# Patient Record
Sex: FEMALE | Race: WHITE | NOT HISPANIC OR LATINO | Employment: FULL TIME
[De-identification: names, ages, dates, MRNs, and addresses within clinical notes are randomized per-mention and may not be internally consistent; named-entity substitution may affect disease eponyms.]

---

## 2019-01-15 ENCOUNTER — TRANSCRIBE ORDERS (OUTPATIENT)
Dept: SCHEDULING | Age: 74
End: 2019-01-15

## 2019-01-15 DIAGNOSIS — Z12.31 ENCOUNTER FOR SCREENING MAMMOGRAM FOR MALIGNANT NEOPLASM OF BREAST: Primary | ICD-10-CM

## 2019-01-28 ENCOUNTER — TRANSCRIBE ORDERS (OUTPATIENT)
Dept: SCHEDULING | Age: 74
End: 2019-01-28

## 2019-01-28 DIAGNOSIS — M89.9 DISORDER OF BONE: Primary | ICD-10-CM

## 2019-02-05 ENCOUNTER — HOSPITAL ENCOUNTER (OUTPATIENT)
Dept: RADIOLOGY | Facility: CLINIC | Age: 74
Discharge: HOME | End: 2019-02-05
Attending: INTERNAL MEDICINE
Payer: COMMERCIAL

## 2019-02-05 DIAGNOSIS — Z12.31 ENCOUNTER FOR SCREENING MAMMOGRAM FOR MALIGNANT NEOPLASM OF BREAST: ICD-10-CM

## 2019-02-05 DIAGNOSIS — M89.9 DISORDER OF BONE: ICD-10-CM

## 2019-02-05 PROCEDURE — 77080 DXA BONE DENSITY AXIAL: CPT

## 2019-02-05 PROCEDURE — 77067 SCR MAMMO BI INCL CAD: CPT

## 2019-02-07 ENCOUNTER — TRANSCRIBE ORDERS (OUTPATIENT)
Dept: RADIOLOGY | Age: 74
End: 2019-02-07

## 2019-02-07 DIAGNOSIS — R92.8 OTHER ABNORMAL AND INCONCLUSIVE FINDINGS ON DIAGNOSTIC IMAGING OF BREAST: Primary | ICD-10-CM

## 2019-02-11 ENCOUNTER — HOSPITAL ENCOUNTER (OUTPATIENT)
Dept: RADIOLOGY | Age: 74
Discharge: HOME | End: 2019-02-11
Attending: RADIOLOGY
Payer: COMMERCIAL

## 2019-02-11 DIAGNOSIS — R92.8 OTHER ABNORMAL AND INCONCLUSIVE FINDINGS ON DIAGNOSTIC IMAGING OF BREAST: ICD-10-CM

## 2019-02-11 PROCEDURE — 77065 DX MAMMO INCL CAD UNI: CPT | Mod: RT

## 2020-02-03 ENCOUNTER — TRANSCRIBE ORDERS (OUTPATIENT)
Dept: SCHEDULING | Age: 75
End: 2020-02-03

## 2020-02-03 DIAGNOSIS — I71.20 THORACIC AORTIC ANEURYSM, WITHOUT RUPTURE: ICD-10-CM

## 2020-02-03 DIAGNOSIS — Z12.31 ENCOUNTER FOR SCREENING MAMMOGRAM FOR MALIGNANT NEOPLASM OF BREAST: Primary | ICD-10-CM

## 2020-02-13 ENCOUNTER — HOSPITAL ENCOUNTER (OUTPATIENT)
Dept: RADIOLOGY | Facility: CLINIC | Age: 75
Discharge: HOME | End: 2020-02-13
Attending: INTERNAL MEDICINE
Payer: COMMERCIAL

## 2020-02-13 DIAGNOSIS — Z12.31 ENCOUNTER FOR SCREENING MAMMOGRAM FOR MALIGNANT NEOPLASM OF BREAST: ICD-10-CM

## 2020-02-13 PROCEDURE — 77067 SCR MAMMO BI INCL CAD: CPT

## 2020-02-18 ENCOUNTER — HOSPITAL ENCOUNTER (OUTPATIENT)
Dept: CARDIOLOGY | Facility: CLINIC | Age: 75
Discharge: HOME | End: 2020-02-18
Attending: INTERNAL MEDICINE
Payer: COMMERCIAL

## 2020-02-18 DIAGNOSIS — I71.20 THORACIC AORTIC ANEURYSM, WITHOUT RUPTURE: ICD-10-CM

## 2020-02-18 LAB
ABDOMINAL DIST AORTA AP: 4.12 CM
ABDOMINAL DIST AORTA TRANS: 5.27 CM
ABDOMINAL DIST AORTA VEL: 95.7 CM/S
ABDOMINAL LT COM ILIAC AP: 1.78 CM
ABDOMINAL LT COM ILIAC TRANS: 1.99 CM
ABDOMINAL LT COM ILIAC VEL: 99.2 CM/S
ABDOMINAL MID AORTA AP: 5.19 CM
ABDOMINAL MID AORTA TRANS: 5.72 CM
ABDOMINAL MID AORTA VEL: 64.1 CM/S
ABDOMINAL PROX AORTA AP: 4.56 CM
ABDOMINAL PROX AORTA TRANS: 5.38 CM
ABDOMINAL PROX AORTA VEL: 81.3 CM/S
ABDOMINAL RT COM ILIAC AP: 1.59 CM
ABDOMINAL RT COM ILIAC TRANS: 1.61 CM
ABDOMINAL RT COM ILIAC VEL: 101.4 CM/S
DIST AORTA EDV: 17 CM/S
LEFT COMMON ILIAC RATIO: 1.04
LT COM ILIAC PROX EDV: 11.4 CM/S
MID AORTA EDV: 10.1 CM/S
PROX AORTA EDV: 9.8 CM/S
RIGHT COMMON ILIAC RATIO: 1.06
RT COM ILIAC PROX EDV: 15.8 CM/S

## 2020-02-18 PROCEDURE — 93978 VASCULAR STUDY: CPT

## 2020-03-09 ENCOUNTER — TRANSCRIBE ORDERS (OUTPATIENT)
Dept: SCHEDULING | Age: 75
End: 2020-03-09

## 2020-03-09 DIAGNOSIS — J47.9 BRONCHIECTASIS, UNCOMPLICATED (CMS/HCC): Primary | ICD-10-CM

## 2020-03-10 ENCOUNTER — HOSPITAL ENCOUNTER (OUTPATIENT)
Dept: RADIOLOGY | Facility: CLINIC | Age: 75
Discharge: HOME | End: 2020-03-10
Attending: INTERNAL MEDICINE
Payer: COMMERCIAL

## 2020-03-10 VITALS — WEIGHT: 165 LBS

## 2020-03-10 DIAGNOSIS — J47.9 BRONCHIECTASIS, UNCOMPLICATED (CMS/HCC): ICD-10-CM

## 2020-03-10 PROCEDURE — 71250 CT THORAX DX C-: CPT

## 2020-04-09 ENCOUNTER — TRANSCRIBE ORDERS (OUTPATIENT)
Dept: SCHEDULING | Age: 75
End: 2020-04-09

## 2020-04-09 ENCOUNTER — TRANSCRIBE ORDERS (OUTPATIENT)
Dept: CARDIOLOGY | Facility: HOSPITAL | Age: 75
End: 2020-04-09

## 2020-04-09 ENCOUNTER — APPOINTMENT (OUTPATIENT)
Dept: LAB | Facility: HOSPITAL | Age: 75
End: 2020-04-09
Attending: SURGERY
Payer: COMMERCIAL

## 2020-04-09 ENCOUNTER — TELEPHONE (OUTPATIENT)
Dept: CARDIOLOGY | Facility: HOSPITAL | Age: 75
End: 2020-04-09

## 2020-04-09 DIAGNOSIS — Z01.810 PREOP CARDIOVASCULAR EXAM: Primary | ICD-10-CM

## 2020-04-09 DIAGNOSIS — I71.40 ABDOMINAL AORTIC ANEURYSM, WITHOUT RUPTURE: Primary | ICD-10-CM

## 2020-04-09 DIAGNOSIS — I11.9 HYPERTENSIVE HEART DISEASE WITHOUT HEART FAILURE: ICD-10-CM

## 2020-04-09 DIAGNOSIS — I71.40 ABDOMINAL AORTIC ANEURYSM, WITHOUT RUPTURE: ICD-10-CM

## 2020-04-09 LAB
BUN SERPL-MCNC: 19 MG/DL (ref 8–20)
CREAT SERPL-MCNC: 0.7 MG/DL (ref 0.6–1.1)
GFR SERPL CREATININE-BSD FRML MDRD: >60 ML/MIN/1.73M*2

## 2020-04-09 PROCEDURE — 36415 COLL VENOUS BLD VENIPUNCTURE: CPT

## 2020-04-09 PROCEDURE — 84520 ASSAY OF UREA NITROGEN: CPT

## 2020-04-09 PROCEDURE — 82565 ASSAY OF CREATININE: CPT

## 2020-04-10 ENCOUNTER — HOSPITAL ENCOUNTER (OUTPATIENT)
Dept: CARDIOLOGY | Facility: HOSPITAL | Age: 75
Discharge: HOME | End: 2020-04-10
Attending: INTERNAL MEDICINE
Payer: COMMERCIAL

## 2020-04-10 ENCOUNTER — HOSPITAL ENCOUNTER (OUTPATIENT)
Dept: RADIOLOGY | Facility: HOSPITAL | Age: 75
Discharge: HOME | End: 2020-04-10
Attending: SURGERY
Payer: COMMERCIAL

## 2020-04-10 DIAGNOSIS — Z01.810 PREOP CARDIOVASCULAR EXAM: ICD-10-CM

## 2020-04-10 DIAGNOSIS — I71.40 ABDOMINAL AORTIC ANEURYSM, WITHOUT RUPTURE: ICD-10-CM

## 2020-04-10 DIAGNOSIS — I11.9 HYPERTENSIVE HEART DISEASE WITHOUT HEART FAILURE: ICD-10-CM

## 2020-04-10 PROCEDURE — 74174 CTA ABD&PLVS W/CONTRAST: CPT

## 2020-04-10 PROCEDURE — 63600105 HC IODINE BASED CONTRAST: Performed by: SURGERY

## 2020-04-10 PROCEDURE — 93005 ELECTROCARDIOGRAM TRACING: CPT

## 2020-04-10 RX ADMIN — IOHEXOL 115 ML: 300 INJECTION, SOLUTION INTRAVENOUS at 10:45

## 2020-04-11 LAB
ATRIAL RATE: 66
P AXIS: 49
PR INTERVAL: 176
QRS DURATION: 92
QT INTERVAL: 402
QTC CALCULATION(BAZETT): 421
R AXIS: 10
T WAVE AXIS: 88
VENTRICULAR RATE: 66

## 2021-03-01 ENCOUNTER — TRANSCRIBE ORDERS (OUTPATIENT)
Dept: SCHEDULING | Age: 76
End: 2021-03-01

## 2021-03-01 DIAGNOSIS — N95.8 OTHER SPECIFIED MENOPAUSAL AND PERIMENOPAUSAL DISORDERS: ICD-10-CM

## 2021-03-01 DIAGNOSIS — I77.1 STRICTURE OF ARTERY (CMS/HCC): Primary | ICD-10-CM

## 2021-03-01 DIAGNOSIS — I72.8 ANEURYSM OF OTHER SPECIFIED ARTERIES: Primary | ICD-10-CM

## 2021-03-01 DIAGNOSIS — I72.8 ANEURYSM OF OTHER SPECIFIED ARTERIES: ICD-10-CM

## 2021-03-01 DIAGNOSIS — Z12.31 ENCOUNTER FOR SCREENING MAMMOGRAM FOR MALIGNANT NEOPLASM OF BREAST: Primary | ICD-10-CM

## 2021-03-11 ENCOUNTER — TRANSCRIBE ORDERS (OUTPATIENT)
Dept: SCHEDULING | Age: 76
End: 2021-03-11

## 2021-03-11 DIAGNOSIS — I72.8 ANEURYSM OF OTHER SPECIFIED ARTERIES: Primary | ICD-10-CM

## 2021-03-18 ENCOUNTER — TRANSCRIBE ORDERS (OUTPATIENT)
Dept: REGISTRATION | Facility: CLINIC | Age: 76
End: 2021-03-18

## 2021-03-18 ENCOUNTER — APPOINTMENT (OUTPATIENT)
Dept: LAB | Facility: CLINIC | Age: 76
End: 2021-03-18
Attending: INTERNAL MEDICINE
Payer: COMMERCIAL

## 2021-03-18 DIAGNOSIS — M06.9 RHEUMATOID ARTHRITIS, UNSPECIFIED: ICD-10-CM

## 2021-03-18 DIAGNOSIS — M06.9 RHEUMATOID ARTHRITIS, UNSPECIFIED: Primary | ICD-10-CM

## 2021-03-18 LAB
ANION GAP SERPL CALC-SCNC: 10 MEQ/L (ref 3–15)
BUN SERPL-MCNC: 14 MG/DL (ref 8–20)
CALCIUM SERPL-MCNC: 9.5 MG/DL (ref 8.9–10.3)
CHLORIDE SERPL-SCNC: 102 MEQ/L (ref 98–109)
CO2 SERPL-SCNC: 29 MEQ/L (ref 22–32)
CREAT SERPL-MCNC: 0.7 MG/DL (ref 0.6–1.1)
GFR SERPL CREATININE-BSD FRML MDRD: >60 ML/MIN/1.73M*2
GLUCOSE SERPL-MCNC: 138 MG/DL (ref 70–99)
POTASSIUM SERPL-SCNC: 4.3 MEQ/L (ref 3.6–5.1)
SODIUM SERPL-SCNC: 141 MEQ/L (ref 136–144)

## 2021-03-18 PROCEDURE — 80048 BASIC METABOLIC PNL TOTAL CA: CPT

## 2021-03-18 PROCEDURE — 36415 COLL VENOUS BLD VENIPUNCTURE: CPT

## 2021-03-19 ENCOUNTER — HOSPITAL ENCOUNTER (OUTPATIENT)
Dept: RADIOLOGY | Facility: CLINIC | Age: 76
Discharge: HOME | End: 2021-03-19
Attending: INTERNAL MEDICINE
Payer: COMMERCIAL

## 2021-03-19 DIAGNOSIS — I72.8 ANEURYSM OF OTHER SPECIFIED ARTERIES: ICD-10-CM

## 2021-03-19 PROCEDURE — 63600105 HC IODINE BASED CONTRAST

## 2021-03-19 PROCEDURE — 74174 CTA ABD&PLVS W/CONTRAST: CPT

## 2021-03-19 RX ADMIN — IOHEXOL 125 ML: 350 INJECTION, SOLUTION INTRAVENOUS at 11:47

## 2021-03-30 ENCOUNTER — TRANSCRIBE ORDERS (OUTPATIENT)
Dept: SCHEDULING | Age: 76
End: 2021-03-30

## 2021-03-30 DIAGNOSIS — G43.709 CHRONIC MIGRAINE WITHOUT AURA, NOT INTRACTABLE, WITHOUT STATUS MIGRAINOSUS: ICD-10-CM

## 2021-03-30 DIAGNOSIS — I65.23 OCCLUSION AND STENOSIS OF BILATERAL CAROTID ARTERIES: ICD-10-CM

## 2021-03-30 DIAGNOSIS — E78.5 HYPERLIPIDEMIA, UNSPECIFIED: ICD-10-CM

## 2021-03-30 DIAGNOSIS — I77.1 STRICTURE OF ARTERY (CMS/HCC): Primary | ICD-10-CM

## 2021-04-01 ENCOUNTER — HOSPITAL ENCOUNTER (OUTPATIENT)
Dept: CARDIOLOGY | Facility: CLINIC | Age: 76
Discharge: HOME | End: 2021-04-01
Attending: INTERNAL MEDICINE
Payer: COMMERCIAL

## 2021-04-01 VITALS
BODY MASS INDEX: 28.17 KG/M2 | WEIGHT: 165 LBS | SYSTOLIC BLOOD PRESSURE: 125 MMHG | DIASTOLIC BLOOD PRESSURE: 80 MMHG | HEIGHT: 64 IN

## 2021-04-01 DIAGNOSIS — E78.5 HYPERLIPIDEMIA, UNSPECIFIED: ICD-10-CM

## 2021-04-01 DIAGNOSIS — I77.1 STRICTURE OF ARTERY (CMS/HCC): ICD-10-CM

## 2021-04-01 DIAGNOSIS — G43.709 CHRONIC MIGRAINE WITHOUT AURA, NOT INTRACTABLE, WITHOUT STATUS MIGRAINOSUS: ICD-10-CM

## 2021-04-01 DIAGNOSIS — I65.23 OCCLUSION AND STENOSIS OF BILATERAL CAROTID ARTERIES: ICD-10-CM

## 2021-04-01 LAB
BSA FOR ECHO PROCEDURE: 1.84 M2
LEFT CCA DIST DIAS: 29.63 CM/S
LEFT CCA DIST SYS: 87.84 CM/S
LEFT CCA MID DIAS: 23.16 CM/S
LEFT CCA MID SYS: 76.2 CM/S
LEFT CCA PROX DIAS: 25.74 CM/S
LEFT CCA PROX SYS: 86.55 CM/S
LEFT ECA DIAS: 20.66 CM/S
LEFT ECA SYS: 90.31 CM/S
LEFT ICA DIST DIAS: 43.19 CM/S
LEFT ICA DIST SYS: 101.47 CM/S
LEFT ICA MID DIAS: 26.15 CM/S
LEFT ICA MID SYS: 58.71 CM/S
LEFT ICA PROX DIAS: 18.81 CM/S
LEFT ICA PROX SYS: 45.07 CM/S
LEFT ICA/CCA SYS: 1.16
LEFT VERTEBRAL DIAS: 17.65 CM/S
LEFT VERTEBRAL SYS: 59.07 CM/S
LT ECA PROX EDV: 20.7 CM/S
LT ECA PROX PSV: 90.3 CM/S
LT VERTEBRAL MID EDV: 17.7 CM/S
LT VERTEBRAL MID PSV: 59.1 CM/S
RIGHT CCA DIST DIAS: 23.35 CM/S
RIGHT CCA DIST SYS: 66.39 CM/S
RIGHT CCA MID DIAS: 22.04 CM/S
RIGHT CCA MID SYS: 72.91 CM/S
RIGHT CCA PROX DIAS: 23.35 CM/S
RIGHT CCA PROX SYS: 74.22 CM/S
RIGHT ECA DIAS: 25.43 CM/S
RIGHT ECA SYS: 86.53 CM/S
RIGHT ICA DIST DIAS: 24.54 CM/S
RIGHT ICA DIST SYS: 59.04 CM/S
RIGHT ICA MID DIAS: 24.34 CM/S
RIGHT ICA MID SYS: 60.1 CM/S
RIGHT ICA PROX DIAS: 32.19 CM/S
RIGHT ICA PROX SYS: 50.51 CM/S
RIGHT ICA/CCA SYS: 0.91
RIGHT VERTEBRAL DIAS: 8.61 CM/S
RT ECA PROC EDV: 25.4 CM/S
RT VERTEBRAL MID EDV: 8.6 CM/S
RT VERTEBRAL MID PSV: 31.5 CM/S

## 2021-04-01 PROCEDURE — 93880 EXTRACRANIAL BILAT STUDY: CPT

## 2021-04-01 PROCEDURE — 93306 TTE W/DOPPLER COMPLETE: CPT

## 2021-04-02 LAB
AORTIC ROOT ANNULUS - M-MODE: 3.6 CM
AV REG PEAK VEL: 4.07 M/S
AV REGURGITATION PRESSURE HALF TIME: 1089 MS
BSA FOR ECHO PROCEDURE: 1.84 M2
E WAVE DECELERATION TIME: 285 MS
E/A RATIO: 0.9
E/E' RATIO: 21.6
E/LAT E' RATIO: 13.5
EDV (BP): 55.1 CM3
EDV (MM-TEICH): 195 CM3
EF (A4C): 49.3 %
EF (MM-TEICH): 75.8 %
EF A2C: 55.3 %
EJECTION FRACTION: 51.7 %
ESV (BP): 26.6 CM3
ESV (MM-TEICH): 47.1 CM3
LA/AORTA RATIO: 1.11
LAD 2D - M-MODE: 4 CM
LEFT VENTRICLE DIASTOLIC VOLUME INDEX: 28.97 CM3/M2
LEFT VENTRICLE DIASTOLIC VOLUME: 53.3 CM3
LEFT VENTRICLE SYSTOLIC VOLUME INDEX: 14.67 CM3/M2
LEFT VENTRICLE SYSTOLIC VOLUME: 27 CM3
LV DIASTOLIC VOLUME: 54.9 CM3
LV ESV (APICAL 2 CHAMBER): 24.5 CM3
LVAD-AP2: 21.8 CM2
LVAD-AP4: 21.8 CM2
LVAS-AP2: 12.7 CM2
LVAS-AP4: 14 CM2
LVEDVI(A2C): 29.84 CM3/M2
LVEDVI(BP): 29.95 CM3/M2
LVESVI(A2C): 13.32 CM3/M2
LVESVI(BP): 14.46 CM3/M2
LVLD-AP2: 7.05 CM
LVLD-AP4: 7.35 CM
LVLS-AP2: 5.9 CM
LVLS-AP4: 6.34 CM
M MODE - INTERVENTRICULAR SEPTUM IN END DIASTOLE: 1.13 CM
M MODE - LEFT INTERNAL DIMENSION IN SYSTOLE: 3.39 CM
M MODE - LEFT VENTRICULAR INTERNAL DIMENSION IN DIASTOLE: 6.21 CM
M MODE - LEFT VENTRICULAR POSTERIOR WALL IN END DIASTOLE: 1.17 CM
M MODE - LEFT VENTRICULAR POSTERIOR WALL IN END DIASTOLE: 1.17 CM
MV E'TISSUE VEL-LAT: 0.06 M/S
MV E'TISSUE VEL-MED: 0.04 M/S
MV PEAK A VEL: 0.91 M/S
MV PEAK E VEL: 0.82 M/S

## 2021-04-08 ENCOUNTER — HOSPITAL ENCOUNTER (OUTPATIENT)
Dept: RADIOLOGY | Facility: CLINIC | Age: 76
Discharge: HOME | End: 2021-04-08
Attending: INTERNAL MEDICINE
Payer: COMMERCIAL

## 2021-04-08 DIAGNOSIS — N95.8 OTHER SPECIFIED MENOPAUSAL AND PERIMENOPAUSAL DISORDERS: ICD-10-CM

## 2021-04-08 DIAGNOSIS — Z12.31 ENCOUNTER FOR SCREENING MAMMOGRAM FOR MALIGNANT NEOPLASM OF BREAST: ICD-10-CM

## 2021-04-08 PROCEDURE — 77080 DXA BONE DENSITY AXIAL: CPT

## 2021-04-08 PROCEDURE — 77063 BREAST TOMOSYNTHESIS BI: CPT

## 2021-06-08 ENCOUNTER — TRANSCRIBE ORDERS (OUTPATIENT)
Dept: SCHEDULING | Age: 76
End: 2021-06-08

## 2021-06-08 DIAGNOSIS — I77.1 STRICTURE OF ARTERY (CMS/HCC): Primary | ICD-10-CM

## 2021-06-28 ENCOUNTER — HOSPITAL ENCOUNTER (OUTPATIENT)
Dept: CARDIOLOGY | Facility: CLINIC | Age: 76
Discharge: HOME | End: 2021-06-28
Attending: INTERNAL MEDICINE
Payer: COMMERCIAL

## 2021-06-28 ENCOUNTER — TRANSCRIBE ORDERS (OUTPATIENT)
Dept: LAB | Facility: CLINIC | Age: 76
End: 2021-06-28

## 2021-06-28 ENCOUNTER — APPOINTMENT (OUTPATIENT)
Dept: LAB | Facility: CLINIC | Age: 76
End: 2021-06-28
Attending: INTERNAL MEDICINE
Payer: COMMERCIAL

## 2021-06-28 DIAGNOSIS — I77.1 STRICTURE OF ARTERY (CMS/HCC): ICD-10-CM

## 2021-06-28 DIAGNOSIS — E78.5 HYPERLIPIDEMIA, UNSPECIFIED: Primary | ICD-10-CM

## 2021-06-28 DIAGNOSIS — E78.5 HYPERLIPIDEMIA, UNSPECIFIED: ICD-10-CM

## 2021-06-28 LAB
ALBUMIN SERPL-MCNC: 3.8 G/DL (ref 3.4–5)
ALP SERPL-CCNC: 49 IU/L (ref 35–126)
ALT SERPL-CCNC: 24 IU/L (ref 11–54)
ANION GAP SERPL CALC-SCNC: 10 MEQ/L (ref 3–15)
AST SERPL-CCNC: 22 IU/L (ref 15–41)
BILIRUB SERPL-MCNC: 0.6 MG/DL (ref 0.3–1.2)
BUN SERPL-MCNC: 17 MG/DL (ref 8–20)
CALCIUM SERPL-MCNC: 9.8 MG/DL (ref 8.9–10.3)
CHLORIDE SERPL-SCNC: 104 MEQ/L (ref 98–109)
CHOLEST SERPL-MCNC: 160 MG/DL
CO2 SERPL-SCNC: 28 MEQ/L (ref 22–32)
CREAT SERPL-MCNC: 0.7 MG/DL (ref 0.6–1.1)
GFR SERPL CREATININE-BSD FRML MDRD: >60 ML/MIN/1.73M*2
GLUCOSE SERPL-MCNC: 102 MG/DL (ref 70–99)
HDLC SERPL-MCNC: 58 MG/DL
HDLC SERPL: 2.8 {RATIO}
LDLC SERPL CALC-MCNC: 88 MG/DL
LT AXILLARY PSV: 42.3 CM/S
LT AXILLARY RATIO: 0.69
LT BRACHIAL DIST PSV: 72.9 CM/S
LT BRACHIAL DIST RATIO: 1.25
LT BRACHIAL MID PSV: 58.4 CM/S
LT BRACHIAL MID RATIO: 0.68
LT BRACHIAL PROX PSV: 85.9 CM/S
LT BRACHIAL PROX RATIO: 2.03
LT RADIAL DIST PSV: 39 CM/S
LT RADIAL PROX PSV: 45.5 CM/S
LT RADIAL PROX RATIO: 0.62
LT SUBCLAVIAN DIST PSV: 60.9 CM/S
LT SUBCLAVIAN DIST RATIO: 0.87
LT SUBCLAVIAN MID PSV: 70.16 CM/S
LT SUBCLAVIAN MID RATIO: 0.67
LT SUBCLAVIAN PROX PSV: 105.03 CM/S
LT ULNAR DIST PSV: 51.8 CM/S
LT ULNAR PROX PSV: 43.8 CM/S
LT ULNAR PROX RATIO: 0.6
NONHDLC SERPL-MCNC: 102 MG/DL
POTASSIUM SERPL-SCNC: 4.7 MEQ/L (ref 3.6–5.1)
PROT SERPL-MCNC: 6.5 G/DL (ref 6–8.2)
RT AXILLARY PSV: 41.2 CM/S
RT AXILLARY RATIO: 0.87
RT BRACHIAL DIST PSV: 61.6 CM/S
RT BRACHIAL DIST RATIO: 0.97
RT BRACHIAL MID PSV: 63.3 CM/S
RT BRACHIAL MID RATIO: 0.83
RT BRACHIAL PROX PSV: 76.2 CM/S
RT BRACHIAL PROX RATIO: 1.85
RT RADIAL DIST PSV: 28.7 CM/S
RT RADIAL PROX PSV: 35 CM/S
RT RADIAL PROX RATIO: 0.57
RT SUBCLAVIAN DIST PSV: 47.6 CM/S
RT SUBCLAVIAN DIST RATIO: 0.85
RT SUBCLAVIAN MID PSV: 55.7 CM/S
RT SUBCLAVIAN MID RATIO: 0.9
RT SUBCLAVIAN PROX PSV: 62.2 CM/S
RT ULNAR DIST PSV: 30 CM/S
RT ULNAR PROX PSV: 33.9 CM/S
RT ULNAR PROX RATIO: 0.55
SODIUM SERPL-SCNC: 142 MEQ/L (ref 136–144)
TRIGL SERPL-MCNC: 71 MG/DL (ref 30–149)
TSH SERPL DL<=0.05 MIU/L-ACNC: 1.75 MIU/L (ref 0.34–5.6)

## 2021-06-28 PROCEDURE — 84443 ASSAY THYROID STIM HORMONE: CPT

## 2021-06-28 PROCEDURE — 80061 LIPID PANEL: CPT

## 2021-06-28 PROCEDURE — 36415 COLL VENOUS BLD VENIPUNCTURE: CPT

## 2021-06-28 PROCEDURE — 93930 UPPER EXTREMITY STUDY: CPT

## 2021-06-28 PROCEDURE — 80053 COMPREHEN METABOLIC PANEL: CPT

## 2022-03-02 ENCOUNTER — HOSPITAL ENCOUNTER (OUTPATIENT)
Dept: RADIOLOGY | Facility: CLINIC | Age: 77
Discharge: HOME | End: 2022-03-02
Attending: INTERNAL MEDICINE
Payer: COMMERCIAL

## 2022-03-02 ENCOUNTER — TRANSCRIBE ORDERS (OUTPATIENT)
Dept: RADIOLOGY | Facility: CLINIC | Age: 77
End: 2022-03-02

## 2022-03-02 DIAGNOSIS — M54.9 DORSALGIA, UNSPECIFIED: ICD-10-CM

## 2022-03-02 DIAGNOSIS — M54.9 DORSALGIA, UNSPECIFIED: Primary | ICD-10-CM

## 2022-03-02 PROCEDURE — 72220 X-RAY EXAM SACRUM TAILBONE: CPT

## 2022-03-02 PROCEDURE — 72100 X-RAY EXAM L-S SPINE 2/3 VWS: CPT

## 2022-03-22 ENCOUNTER — TRANSCRIBE ORDERS (OUTPATIENT)
Dept: SCHEDULING | Age: 77
End: 2022-03-22

## 2022-03-22 DIAGNOSIS — Z12.31 ENCOUNTER FOR SCREENING MAMMOGRAM FOR MALIGNANT NEOPLASM OF BREAST: Primary | ICD-10-CM

## 2022-03-22 DIAGNOSIS — I71.9 AORTIC ANEURYSM OF UNSPECIFIED SITE, WITHOUT RUPTURE (CMS/HCC): ICD-10-CM

## 2022-04-14 ENCOUNTER — APPOINTMENT (OUTPATIENT)
Dept: LAB | Facility: CLINIC | Age: 77
End: 2022-04-14
Attending: INTERNAL MEDICINE
Payer: COMMERCIAL

## 2022-04-14 ENCOUNTER — TRANSCRIBE ORDERS (OUTPATIENT)
Dept: LAB | Facility: CLINIC | Age: 77
End: 2022-04-14

## 2022-04-14 DIAGNOSIS — E03.9 HYPOTHYROIDISM, UNSPECIFIED: ICD-10-CM

## 2022-04-14 DIAGNOSIS — E03.9 HYPOTHYROIDISM, UNSPECIFIED: Primary | ICD-10-CM

## 2022-04-14 DIAGNOSIS — I71.9 AORTIC ANEURYSM OF UNSPECIFIED SITE, WITHOUT RUPTURE (CMS/HCC): ICD-10-CM

## 2022-04-14 LAB
ANION GAP SERPL CALC-SCNC: 12 MEQ/L (ref 3–15)
BUN SERPL-MCNC: 17 MG/DL (ref 8–20)
CALCIUM SERPL-MCNC: 9.7 MG/DL (ref 8.9–10.3)
CHLORIDE SERPL-SCNC: 103 MEQ/L (ref 98–109)
CO2 SERPL-SCNC: 25 MEQ/L (ref 22–32)
CREAT SERPL-MCNC: 0.7 MG/DL (ref 0.6–1.1)
GFR SERPL CREATININE-BSD FRML MDRD: >60 ML/MIN/1.73M*2
GLUCOSE SERPL-MCNC: 90 MG/DL (ref 70–99)
POTASSIUM SERPL-SCNC: 4.8 MEQ/L (ref 3.6–5.1)
SODIUM SERPL-SCNC: 140 MEQ/L (ref 136–144)
TSH SERPL DL<=0.05 MIU/L-ACNC: 3.75 MIU/L (ref 0.34–5.6)

## 2022-04-14 PROCEDURE — 84443 ASSAY THYROID STIM HORMONE: CPT

## 2022-04-14 PROCEDURE — 80048 BASIC METABOLIC PNL TOTAL CA: CPT

## 2022-04-14 PROCEDURE — 36415 COLL VENOUS BLD VENIPUNCTURE: CPT

## 2022-04-21 ENCOUNTER — HOSPITAL ENCOUNTER (OUTPATIENT)
Dept: RADIOLOGY | Facility: CLINIC | Age: 77
Discharge: HOME | End: 2022-04-21
Attending: INTERNAL MEDICINE
Payer: COMMERCIAL

## 2022-04-21 DIAGNOSIS — Z12.31 ENCOUNTER FOR SCREENING MAMMOGRAM FOR MALIGNANT NEOPLASM OF BREAST: ICD-10-CM

## 2022-04-21 DIAGNOSIS — I71.9 AORTIC ANEURYSM OF UNSPECIFIED SITE, WITHOUT RUPTURE (CMS/HCC): ICD-10-CM

## 2022-04-21 PROCEDURE — 77063 BREAST TOMOSYNTHESIS BI: CPT

## 2022-04-21 PROCEDURE — 63600105 HC IODINE BASED CONTRAST

## 2022-04-21 PROCEDURE — 74174 CTA ABD&PLVS W/CONTRAST: CPT

## 2022-04-21 RX ADMIN — IOHEXOL 125 ML: 350 INJECTION, SOLUTION INTRAVENOUS at 09:41

## 2023-02-07 ENCOUNTER — TRANSCRIBE ORDERS (OUTPATIENT)
Dept: SCHEDULING | Age: 78
End: 2023-02-07

## 2023-02-07 DIAGNOSIS — I71.9 AORTIC ANEURYSM OF UNSPECIFIED SITE, WITHOUT RUPTURE (CMS/HCC): ICD-10-CM

## 2023-02-07 DIAGNOSIS — Z12.31 ENCOUNTER FOR SCREENING MAMMOGRAM FOR MALIGNANT NEOPLASM OF BREAST: Primary | ICD-10-CM

## 2023-02-07 DIAGNOSIS — Z78.0 ASYMPTOMATIC MENOPAUSAL STATE: ICD-10-CM

## 2023-04-18 ENCOUNTER — APPOINTMENT (OUTPATIENT)
Dept: LAB | Facility: HOSPITAL | Age: 78
End: 2023-04-18
Attending: INTERNAL MEDICINE
Payer: COMMERCIAL

## 2023-04-18 ENCOUNTER — TRANSCRIBE ORDERS (OUTPATIENT)
Dept: LAB | Facility: HOSPITAL | Age: 78
End: 2023-04-18

## 2023-04-18 DIAGNOSIS — Z01.812 ENCOUNTER FOR PREPROCEDURAL LABORATORY EXAMINATION: ICD-10-CM

## 2023-04-18 DIAGNOSIS — Z01.812 ENCOUNTER FOR PREPROCEDURAL LABORATORY EXAMINATION: Primary | ICD-10-CM

## 2023-04-18 LAB
ANION GAP SERPL CALC-SCNC: 9 MEQ/L (ref 3–15)
BUN SERPL-MCNC: 13 MG/DL (ref 8–20)
CALCIUM SERPL-MCNC: 9.6 MG/DL (ref 8.9–10.3)
CHLORIDE SERPL-SCNC: 104 MEQ/L (ref 98–109)
CO2 SERPL-SCNC: 28 MEQ/L (ref 22–32)
CREAT SERPL-MCNC: 0.6 MG/DL (ref 0.6–1.1)
GFR SERPL CREATININE-BSD FRML MDRD: >60 ML/MIN/1.73M*2
GLUCOSE SERPL-MCNC: 98 MG/DL (ref 70–99)
POTASSIUM SERPL-SCNC: 4.3 MEQ/L (ref 3.6–5.1)
SODIUM SERPL-SCNC: 141 MEQ/L (ref 136–144)

## 2023-04-18 PROCEDURE — 36415 COLL VENOUS BLD VENIPUNCTURE: CPT

## 2023-04-18 PROCEDURE — 80048 BASIC METABOLIC PNL TOTAL CA: CPT

## 2023-04-25 ENCOUNTER — HOSPITAL ENCOUNTER (OUTPATIENT)
Dept: RADIOLOGY | Facility: CLINIC | Age: 78
Discharge: HOME | End: 2023-04-25
Attending: INTERNAL MEDICINE
Payer: COMMERCIAL

## 2023-04-25 DIAGNOSIS — I71.9 AORTIC ANEURYSM OF UNSPECIFIED SITE, WITHOUT RUPTURE (CMS/HCC): ICD-10-CM

## 2023-04-25 DIAGNOSIS — Z12.31 ENCOUNTER FOR SCREENING MAMMOGRAM FOR MALIGNANT NEOPLASM OF BREAST: ICD-10-CM

## 2023-04-25 DIAGNOSIS — Z78.0 ASYMPTOMATIC MENOPAUSAL STATE: ICD-10-CM

## 2023-04-25 PROCEDURE — 77080 DXA BONE DENSITY AXIAL: CPT

## 2023-04-25 PROCEDURE — 77063 BREAST TOMOSYNTHESIS BI: CPT

## 2023-04-25 PROCEDURE — 63600105 HC IODINE BASED CONTRAST

## 2023-04-25 PROCEDURE — 74174 CTA ABD&PLVS W/CONTRAST: CPT

## 2023-04-25 RX ADMIN — IOHEXOL 100 ML: 350 INJECTION, SOLUTION INTRAVENOUS at 09:15

## 2024-04-04 ENCOUNTER — TRANSCRIBE ORDERS (OUTPATIENT)
Dept: SCHEDULING | Age: 79
End: 2024-04-04

## 2024-04-04 DIAGNOSIS — I71.9 AORTIC ANEURYSM OF UNSPECIFIED SITE, WITHOUT RUPTURE (CMS/HCC): Primary | ICD-10-CM

## 2024-04-04 DIAGNOSIS — Z12.31 ENCOUNTER FOR SCREENING MAMMOGRAM FOR MALIGNANT NEOPLASM OF BREAST: ICD-10-CM

## 2024-04-23 ENCOUNTER — APPOINTMENT (OUTPATIENT)
Dept: LAB | Facility: CLINIC | Age: 79
End: 2024-04-23
Attending: INTERNAL MEDICINE
Payer: COMMERCIAL

## 2024-04-23 ENCOUNTER — TRANSCRIBE ORDERS (OUTPATIENT)
Dept: LAB | Facility: CLINIC | Age: 79
End: 2024-04-23

## 2024-04-23 DIAGNOSIS — I71.9 AORTIC ANEURYSM OF UNSPECIFIED SITE, WITHOUT RUPTURE (CMS/HCC): Primary | ICD-10-CM

## 2024-04-23 DIAGNOSIS — I71.9 AORTIC ANEURYSM OF UNSPECIFIED SITE, WITHOUT RUPTURE (CMS/HCC): ICD-10-CM

## 2024-04-23 LAB
ANION GAP SERPL CALC-SCNC: 8 MEQ/L (ref 3–15)
BUN SERPL-MCNC: 21 MG/DL (ref 7–25)
CALCIUM SERPL-MCNC: 9.4 MG/DL (ref 8.6–10.3)
CHLORIDE SERPL-SCNC: 102 MEQ/L (ref 98–107)
CO2 SERPL-SCNC: 30 MEQ/L (ref 21–31)
CREAT SERPL-MCNC: 0.6 MG/DL (ref 0.6–1.2)
EGFRCR SERPLBLD CKD-EPI 2021: >60 ML/MIN/1.73M*2
GLUCOSE SERPL-MCNC: 77 MG/DL (ref 70–99)
POTASSIUM SERPL-SCNC: 4.7 MEQ/L (ref 3.5–5.1)
SODIUM SERPL-SCNC: 140 MEQ/L (ref 136–145)

## 2024-04-23 PROCEDURE — 80048 BASIC METABOLIC PNL TOTAL CA: CPT

## 2024-04-23 PROCEDURE — 36415 COLL VENOUS BLD VENIPUNCTURE: CPT

## 2024-04-29 ENCOUNTER — HOSPITAL ENCOUNTER (OUTPATIENT)
Dept: RADIOLOGY | Facility: CLINIC | Age: 79
Discharge: HOME | End: 2024-04-29
Attending: INTERNAL MEDICINE
Payer: COMMERCIAL

## 2024-04-29 DIAGNOSIS — I71.9 AORTIC ANEURYSM OF UNSPECIFIED SITE, WITHOUT RUPTURE (CMS/HCC): ICD-10-CM

## 2024-04-29 DIAGNOSIS — Z12.31 ENCOUNTER FOR SCREENING MAMMOGRAM FOR MALIGNANT NEOPLASM OF BREAST: ICD-10-CM

## 2024-04-29 PROCEDURE — 74174 CTA ABD&PLVS W/CONTRAST: CPT

## 2024-04-29 PROCEDURE — 77063 BREAST TOMOSYNTHESIS BI: CPT

## 2024-04-29 PROCEDURE — 63600105 HC IODINE BASED CONTRAST: Mod: JZ

## 2024-04-29 RX ORDER — IOPAMIDOL 755 MG/ML
100 INJECTION, SOLUTION INTRAVASCULAR
Status: COMPLETED | OUTPATIENT
Start: 2024-04-29 | End: 2024-04-29

## 2024-04-29 RX ADMIN — IOPAMIDOL 100 ML: 755 INJECTION, SOLUTION INTRAVENOUS at 11:02

## 2024-04-29 NOTE — PROGRESS NOTES
"Rafi LopezBrie   [id number]    Your doctor has referred you for a CT examination that requires the injection of an iodinated contrast material into your bloodstream. This iodinated contrast material, sometimes referred to as \"x-ray dye\" allows for better interpretation of the x-ray films or CT images and results in a more accurate interpretation of the examination.     Without the use of iodinated contrast (x-ray dye), the examination may be less informative and result in a suboptimal examination, and possibly a delay in diagnosis and, if needed, treatment.     The iodinated contrast material is given through a small needle or catheter placed into a vein, usually on the inside of the elbow, on the back of hand, or in a vein in the foot or lower leg.    The most common, though still rare, potential reaction to an intravenous contrast injection is an allergic-like reaction. Most allergic-like reactions are mild, though a small subset of people can have more severe reactions. Mild reactions include mild / scattered hives, itching, scratchy throat, sneezing and nasal congestion. More severe reactions include facial swelling, severe difficulty breathing, wheezing and anaphylactic shock. Those with a prior history allergic-like reaction to the same class of contrast media (such as CT contrast or MRI contrast) are at the highest risk for an allergic reaction.     If you believe you had an allergic reaction to contrast in the past, please let our staff know. We can determine if this increases your risk for a future reaction and provide steps to decrease the risk. This may delay your examination, but it decreases the risk of having a new and possibly more severe reaction to the contrast injection.    People with a history of prior allergic reactions to other substances (such as unrelated medications and food) and patients with a history of asthma have slightly increased risk for an allergic reaction from contrast " material when compared with the general population, but do not require to be pretreated prior to a contrast injection.    You should notify the physician, nurse or technologist if you have ever had any of these conditions or if you have any questions.

## 2024-07-25 ENCOUNTER — TRANSCRIBE ORDERS (OUTPATIENT)
Dept: REGISTRATION | Facility: REHABILITATION | Age: 79
End: 2024-07-25

## 2024-07-29 ENCOUNTER — TRANSCRIBE ORDERS (OUTPATIENT)
Dept: SCHEDULING | Age: 79
End: 2024-07-29

## 2024-08-14 ENCOUNTER — TRANSCRIBE ORDERS (OUTPATIENT)
Dept: SCHEDULING | Facility: REHABILITATION | Age: 79
End: 2024-08-14

## 2024-08-14 DIAGNOSIS — M17.11 UNILATERAL PRIMARY OSTEOARTHRITIS, RIGHT KNEE: Primary | ICD-10-CM

## 2024-08-14 DIAGNOSIS — M17.12 UNILATERAL PRIMARY OSTEOARTHRITIS, LEFT KNEE: ICD-10-CM

## 2024-09-12 ENCOUNTER — HOSPITAL ENCOUNTER (OUTPATIENT)
Dept: PHYSICAL THERAPY | Facility: CLINIC | Age: 79
Setting detail: THERAPIES SERIES
Discharge: HOME | End: 2024-09-12
Attending: FAMILY MEDICINE
Payer: COMMERCIAL

## 2024-09-12 DIAGNOSIS — M17.12 UNILATERAL PRIMARY OSTEOARTHRITIS, LEFT KNEE: ICD-10-CM

## 2024-09-12 DIAGNOSIS — M17.11 UNILATERAL PRIMARY OSTEOARTHRITIS, RIGHT KNEE: ICD-10-CM

## 2024-09-12 PROCEDURE — 97530 THERAPEUTIC ACTIVITIES: CPT | Mod: GP

## 2024-09-12 PROCEDURE — 97110 THERAPEUTIC EXERCISES: CPT | Mod: GP

## 2024-09-12 PROCEDURE — 97162 PT EVAL MOD COMPLEX 30 MIN: CPT | Mod: GP

## 2024-09-12 ASSESSMENT — GAIT ASSESSMENTS
TUG TIME: 8.3
TUG TIME: 8.1
TUG TIME: 8.4
TUG TIME: 8.4

## 2024-09-12 NOTE — PATIENT INSTRUCTIONS
Request Type: Extend Schedule    Type of Visit: Single Serve    Evaluation Date: 9/12/24    Extension Date: 11/11/24    POC/Schedule: PT Cert From: 09/12/24       PT Cert To: 11/11/24                               Does patient require authorization prior to treatments?: no     Designation: Double up    Frequency of treatment: 2 times/week  PT Duration: 6 weeks       Scheduling Instructions: Second visit with lead therapist (not PTA)    **NOTE:**  Please schedule within the therapists listed below with no more than 3 therapists total for the whole plan of care. Please schedule last visit with a PT or OT, not a PTA or JOSHI. If the lead therapist is not the evaluating therapist, please inform the new lead therapist of this change.     Lead therapist: Rachel    Comments:     SUBGROUPS:  Ortho Sports Med

## 2024-09-12 NOTE — LETTER
Dear DR. Pedroza,    Thank you for this referral. Please review the attached notes and plan of care for your approval.  Please contact our department with any questions.     Sincerely,     Kareen Barnes, PT  [address]  [address]  [place] [address]  Phone [phone]  Fax  [phone]    By co-signing this Plan of Care (POC) you agree to the following:  I have reviewed the the Plan of Care established by the therapist within this document and certify that the services are skilled and medically necessary. I have reviewed the plan and recommend that these services continue to meet the goals stated in this document.    PHYSICIAN SIGNATURE: __________________________________     DATE: ___________________  TIME: _____________           Physical Therapy Plan of Care 24   Effective from: 2024  Effective to: 2024    Plan ID: [id number]            Participants as of Finalize on 2024    Name Type Comments Contact Info    Anibal Pedroza MD Referring Provider  [phone]    Kareen Barnes, PT Physical Therapist         Last Progress Notes Note     Author: Kareen Barnes, PT Status: Signed Last edited: 2024  3:00 PM       Physical Therapy Evaluation    [facility] Fax: [phone]    PT EVALUATION FOR OUTPATIENT THERAPY    Patient: Rafi Baird    MRN: [id number]  : 1945 79 y.o.     Referring Physician: Anibal Pedroza MD  Date of Visit: 2024      Certification Dates:  24 through 24         Recommended Frequency & Duration:  2 times/week for up to 8 weeks     Diagnosis:   1. Unilateral primary osteoarthritis, right knee    2. Unilateral primary osteoarthritis, left knee        Chief Complaints:   Chief Complaint   Patient presents with   • Pain   • Decreased Mobility       Precautions:    Precautions additional comments:      Past Medical History:   Past Medical History:   Diagnosis Date   • Disease of thyroid gland    •  Hypertension        Past Surgical History: History reviewed. No pertinent surgical history.      LEARNING ASSESSMENT    Assessment completed:  Yes    Learner name:  Rafi    Learner: Patient    Learning Barriers:  Learning barriers: No Barriers    Preferred Language: English     Needed: Yes    Education Provided:   Method: Discussion  Readiness: acceptance  Response: Demonstrated understanding      CO-LEARNER ASSESSMENT:    Completed: No      Welcome letter discussed: Yes Patient provided with Welcome Letter, which includes attendance policy. Provided education regarding cancellation and no-show policy. Education regarding the importance of participation and regular attendance to maximize goal attainment.       OBJECTIVE MEASUREMENTS/DATA:    Time In Session:  Start Time: 1501  Stop Time: 1600  Time Calculation (min): 59 min   Assessment and Plan - 09/12/24 1452          Assessment    Plan of Care reviewed and patient/family in agreement Yes     System Pathology/Pathophysiology Noted neuromuscular;musculoskeletal     Functional Limitations in Following Categories (PT Eval) home management;community/leisure     Rehab Potential/Prognosis good, to achieve stated therapy goals     Problem List decreased strength;pain     Clinical Assessment The patient, a 79 y.o. female, presents with a referring dx of bilateral primary knee OA, demonstrating functional deficits primarily in lower extremity strength and mobility. MMT reveals reduced strength in L hip extension (3+/5), abduction (3+/5), adduction (3+/5), and knee flexion (4-/5 with pain), contributing to challenges with functional mobility, particularly stair navigation and prolonged standing. The TUG score (mean 8.3s) is within age-related norms, reflecting good balance and mobility, but the 5xSTS time (12.8s) indicates reduced lower extremity strength and functional capacity. The patient’s WOMAC score of 20.7% also highlights moderate functional impairment  "due to pain and decreased mobility. Skilled PT is necessary to address these strength deficits, improve functional mobility, and reduce pain, all of which will enhance her stability and independence in daily activities, particularly stair use and ambulation.     Planned Services CPT 65872 Gait training;CPT 61649 Manual therapy;CPT 19349 Neuromuscular Reeducation;CPT 72301 Therapeutic activities;CPT 79344 Therapeutic exercises;CPT 10097 Electrical stimulation ATTENDED;CPT 77454 Hot/Cold Packs therapy                    General Information - 09/12/24 1452          Session Details    Document Type initial evaluation     Mode of Treatment individual therapy        General Information    Referring Physician Anibal Pedroza MD     History of present illness/functional impairment Pt is a 79 y.o. female presenting with a referring diagnosis of BL primary knee OA. She reports that her knee pain significantly worsened approximately 6 weeks ago, prompting her to seek treatment, although the pain has been present for several months. Pt received a series of 3 injections in each knee,  completed 2 weeks ago; which she reports have provided some relief for pain management. Currently, she describes her pain as 3/10 on the NPRS with activity and denies pain at rest. Pt reports difficulty with stair navigation and states that sitting relieves her symptoms. She lives in a 2-story home with 5 BRENNEN from the [facility] and works as a market research analyst. Pt enjoys going to the [place] and states that her primary goal for PT is to \"feel more stable on my feet.\"                    Pain/Vitals - 09/12/24 3577          Pain Assessment    Currently in pain Yes     Preferred Pain Scale number (Numeric Rating Pain Scale)     Pain Side/Orientation bilateral     Pain: Body location Knee     Pain Rating (0-10): Pre Activity 0     Pain Rating (0-10): Activity 3     Pain Rating (0-10): Post Activity 0        Pre Activity Vital Signs    Pulse 72     " SpO2 98 %     /84     BP Location Right upper arm     BP Method Manual     Patient Position Sitting        Pain Intervention    Intervention  assessed, TE, education     Post Intervention Comments monitored                    Falls/Food Screening - 09/12/24 1452          Initial Falls Assessment    One or more falls in the last year No        Food Insecurity    Within the past 12 months, you worried that your food would run out before you got the money to buy more. Never true     Within the past 12 months, the food you bought just didn't last and you didn't have money to get more. Never true                    PT - 09/12/24 1452          Physical Therapy    Physical Therapy Specialty [facility] Sports PT        PT Plan    Frequency of treatment 2 times/week     PT Duration 8 weeks     PT Cert From 09/12/24     PT Cert To 11/11/24     Date PT POC was sent to provider 09/12/24     Signed PT Plan of Care received?  No                       Living Environment    Living Environment - 09/12/24 1452          Living Environment    People in Home alone     Living Arrangements house     Living Environment Comment 2 story home with 5STE+ BHR's                   PLOF:    Prior Level of Function - 09/12/24 1452          OTHER    Previous level of function Fully independent. Drives. Works full time.                   ROM    Range of Motion - 09/12/24 1700          RIGHT: Lower Extremity AROM Assessment    Right LE AROM normal WNL     Knee Flexion   123     Knee Extension   -2   painful       LEFT: Lower Extremity AROM Assessment    Left LE AROM normal WNL     Knee Flexion   121     Knee Extension   -4                   MMT    Manual Muscle Tests - 09/12/24 1452          RIGHT: Lower Extremity Manual Muscle Test Assessment    Hip Flexion gross movement (4+/5) good plus     Hip Extension gross movement (4-/5) good minus     Hip Abduction gross movement (4-/5) good minus     Hip Adduction gross movement (4-/5) good minus      "Knee Flexion strength (4+/5) good plus     Knee Extension strength (4+/5) good plus     Ankle Dorsiflexion gross movement (4+/5) good plus     Ankle Inversion gross movement (4+/5) good plus     Ankle Eversion gross movement (4+/5) good plus        LEFT: Lower Extremity Manual Muscle Test Assessment    Hip Flexion gross movement (4/5) good     Hip Extension gross movement (3+/5) fair plus   painful    Hip Abduction gross movement (3+/5) fair plus     Hip Adduction gross movement (3+/5) fair plus     Knee Flexion strength (4-/5) good minus   painful    Knee Extension strength (4+/5) good plus     Ankle Dorsiflexion gross movement (4/5) good     Ankle Inversion gross movement (4/5) good     Ankle Eversion gross movement (4/5) good                   Ortho Special Tests    Orthopedic Special Tests - 09/12/24 1700          Neurodynamic     SLR Left - Negative;Right - Negative                   Gait and Mobility    Gait and Mobility - 09/12/24 1452          Gait Training    [place], Gait independent     Distance in Feet 100 feet        Stairs Assessment    [place], Stairs modified independence     Assistive Device railing     Handrail Location (Stairs) left side (ascending);left side (descending)     Number of Steps (Stairs) 4     Ascending Stairs Technique step-over-step   painful    Descending Stairs Technique step-over-step   painful                  Outcome Measures    PT Outcome Measures - 09/12/24 1452          Objective Outcome Measures    Five Times to Sit to Stand (FTSTS) 12.8s w/o UE assist        Other Outcome Measures Used/Comments    Other outcome measure used: WOMAC: 20.7%        Timed Up and Go Test    Trial One: Timed Up and Go Test 8.1     Trial Two: Timed Up and Go Test 8.4     Trial Three: Timed Up and Go Test 8.4     Mean of 3 Trials: Timed Up and Go Test 8.3                      Goals          Patient Stated    •  PT Goals (pt-stated)       Patient stated:  \"I want to feel more steady on my " "feet.\"    SHORT TERM GOALS:  Pt reports pain levels < 3 /10 to perform ADLs.  Pt will demonstrate increase BL knee and hip strength > 3+ /5 to improve functional mobility. In 4 weeks.  Pt performs 5xSTS in <12s in order to improve functional mobility. In 4 weeks.  Pt demonstrates  I with HEP as instructed. In 4 weeks.  Pt WOMAC score < 20%. In 4 weeks.    LONG TERM GOALS:  Pt reports pain levels < 2 /10 to perform ADLs.  Pt will demonstrate increase BL knee strength > 4 /5 to improve functional mobility. In 8 weeks.  Pt demonstrates  I with HEP as instructed. In 8 weeks.  Pt WOMAC score < 15%. In 8 weeks.  Pt performs stair ascend/ descend  with reciprocal pattern I with handrail and NPRS <2/10. In 8 weeks.         Other    •  Mutually agreed upon pain goal       Mutually agreed upon pain goal: NPRS </= 2/10                TREATMENT PLAN:      DOS     IE 09/12/24   30 Day     60 Day     Precautions     Treatment G/Y/N Current Session Time   Modalities  CPT 39976 Total Time for Session Not performed   MHP     CP post exer   Manual   CPT 48974 Total Time for Session Not perfomed   STM/MFR/TPR STM and effleurage with retrograde massage to L/R post calf post HS and quad region   Instrument Assisted STM      Mobilizations L/R knee patellar mobs AP sup/inf gr II III   ROM/Flexibility L/R knee PROM    Therapeutic Exercise  CPT 32671 Total Time for Session 8-22 Minutes    Sets Reps Load Comment    pt ed         Pt educated in HEP per written materials; pt verbalized understanding.      NUSTEP              RB                            active HS stretch in supine  Y 2 10   HEP instructed                 standing knee flex at step 1st/2nd             heel cord stretch             standing HS stretch at step                           STRENGTH             SAQ Y 2 10 3\"H HEP instructed   QS SLR Y 2 10 3\"H HEP instructed   Supine clamshells Y 1 15 PTB HEP instructed   bridges             Progression to S/L clamshells           "   TAC progression             STS             bilat HR             step taps             step up progression             Standing hip abduction              Standing hip extension                           Neuromuscular Re-Education  CPT 98695 Total Time for Session Not performed    Sets Reps Load Comment   circuit balance act             lateral side step             for/back walk // bars                                           Gait  CPT 70749 Total Time for Session Not performed   gait training without AD     stair training to progressive recip steps     Therapeutic Activity  CPT 28041 Total Time for Session 8-22 Minutes   Pt education Educated patient on examination findings, impairments identified, dx, prognosis and PT Plan of Care; Pt educated in HEP per written materials; pt verbalized understanding.            Group  CPT 07695 Total Time for Session Not performed      Abbreviation Legend   OTB - Orange theraband   PTB - Pink theraband   GTB - Green theraband   BTB - Blue theraband   HT - Head turns   HN - Head nods   ST - Semi tandem   EO - Eyes open   EC - Eyes closed         ASSESSMENT:    This 79 y.o. year old female presents to PT with above stated diagnosis. Physical Therapy evaluation reveals decreased strength, pain resulting in home management, community/leisure limitations. Rafi Baird will benefit from skilled PT services to address limitation, work towards rehab and patient goals and maximize PLOF of chosen ADLs.     Planned Services: The patient's treatment will include CPT 13506 Gait training, CPT 09589 Manual therapy, CPT 99919 Neuromuscular Reeducation, CPT 31053 Therapeutic activities, CPT 35450 Therapeutic exercises, CPT 15499 Electrical stimulation ATTENDED, CPT 59729 Hot/Cold Packs therapy, .     I attest that I was present for the entire session, directed all activities, made all clinical decisions, and was responsible for all assessments including the review of medications,  allergies and history.    Kareen Barnes PT DPT                           Current Participants as of 9/12/2024    Name Type Comments Contact Info    Anibal Pedroza MD Referring Provider  [phone]    Signature pending    Kareen Barnes PT Physical Therapist      Electronically signed by Kareen Barnes PT at 9/12/2024 1801 EDT

## 2024-09-12 NOTE — OP PT TREATMENT LOG
"  DOS    IE 09/12/24   30 Day     60 Day     Precautions    Treatment G/Y/N Current Session Time   Modalities  CPT 44356 Total Time for Session Not performed   MHP     CP post exer   Manual   CPT 95279 Total Time for Session Not perfomed   STM/MFR/TPR STM and effleurage with retrograde massage to L/R post calf post HS and quad region   Instrument Assisted STM      Mobilizations L/R knee patellar mobs AP sup/inf gr II III   ROM/Flexibility L/R knee PROM    Therapeutic Exercise  CPT 30743 Total Time for Session 8-22 Minutes    Sets Reps Load Comment    pt ed         Pt educated in HEP per written materials; pt verbalized understanding.      NUSTEP              RB                            active HS stretch in supine  Y 2 10   HEP instructed                 standing knee flex at step 1st/2nd          heel cord stretch             standing HS stretch at step                           STRENGTH             SAQ Y 2 10 3\"H HEP instructed   QS SLR Y 2 10 3\"H HEP instructed   Supine clamshells Y 1 15 PTB HEP instructed   bridges        Progression to S/L clamshells        TAC progression        STS        bilat HR        step taps        step up progression             Standing hip abduction              Standing hip extension                      Neuromuscular Re-Education  CPT 71782 Total Time for Session Not performed    Sets Reps Load Comment   circuit balance act             lateral side step             for/back walk // bars                                           Gait  CPT 68267 Total Time for Session Not performed   gait training without AD     stair training to progressive recip steps     Therapeutic Activity  CPT 29535 Total Time for Session 8-22 Minutes   Pt education Educated patient on examination findings, impairments identified, dx, prognosis and PT Plan of Care; Pt educated in HEP per written materials; pt verbalized understanding.            Group  CPT 38360 Total Time for Session Not performed    "   Abbreviation Legend   OTB - Orange theraband   PTB - Pink theraband   GTB - Green theraband   BTB - Blue theraband   HT - Head turns   HN - Head nods   ST - Semi tandem   EO - Eyes open   EC - Eyes closed

## 2024-09-12 NOTE — PROGRESS NOTES
Physical Therapy Evaluation    [facility] Fax: [phone]    PT EVALUATION FOR OUTPATIENT THERAPY    Patient: Rafi Baird    MRN: [id number]  : 1945 79 y.o.     Referring Physician: Anibal Pedroza MD  Date of Visit: 2024      Certification Dates:  24 through 24         Recommended Frequency & Duration:  2 times/week for up to 8 weeks     Diagnosis:   1. Unilateral primary osteoarthritis, right knee    2. Unilateral primary osteoarthritis, left knee        Chief Complaints:   Chief Complaint   Patient presents with    Pain    Decreased Mobility       Precautions:    Precautions additional comments:      Past Medical History:   Past Medical History:   Diagnosis Date    Disease of thyroid gland     Hypertension        Past Surgical History: History reviewed. No pertinent surgical history.      LEARNING ASSESSMENT    Assessment completed:  Yes    Learner name:  Rafi    Learner: Patient    Learning Barriers:  Learning barriers: No Barriers    Preferred Language: English     Needed: Yes    Education Provided:   Method: Discussion  Readiness: acceptance  Response: Demonstrated understanding      CO-LEARNER ASSESSMENT:    Completed: No      Welcome letter discussed: Yes Patient provided with Welcome Letter, which includes attendance policy. Provided education regarding cancellation and no-show policy. Education regarding the importance of participation and regular attendance to maximize goal attainment.       OBJECTIVE MEASUREMENTS/DATA:    Time In Session:  Start Time: 1501  Stop Time: 1600  Time Calculation (min): 59 min   Assessment and Plan - 24 1452          Assessment    Plan of Care reviewed and patient/family in agreement Yes     System Pathology/Pathophysiology Noted neuromuscular;musculoskeletal     Functional Limitations in Following Categories (PT Eval) home management;community/leisure     Rehab Potential/Prognosis good, to achieve stated  therapy goals     Problem List decreased strength;pain     Clinical Assessment The patient, a 79 y.o. female, presents with a referring dx of bilateral primary knee OA, demonstrating functional deficits primarily in lower extremity strength and mobility. MMT reveals reduced strength in L hip extension (3+/5), abduction (3+/5), adduction (3+/5), and knee flexion (4-/5 with pain), contributing to challenges with functional mobility, particularly stair navigation and prolonged standing. The TUG score (mean 8.3s) is within age-related norms, reflecting good balance and mobility, but the 5xSTS time (12.8s) indicates reduced lower extremity strength and functional capacity. The patient’s WOMAC score of 20.7% also highlights moderate functional impairment due to pain and decreased mobility. Skilled PT is necessary to address these strength deficits, improve functional mobility, and reduce pain, all of which will enhance her stability and independence in daily activities, particularly stair use and ambulation.     Planned Services CPT 08478 Gait training;CPT 18110 Manual therapy;CPT 46054 Neuromuscular Reeducation;CPT 92211 Therapeutic activities;CPT 14407 Therapeutic exercises;CPT 90201 Electrical stimulation ATTENDED;CPT 37890 Hot/Cold Packs therapy                    General Information - 09/12/24 7743          Session Details    Document Type initial evaluation     Mode of Treatment individual therapy        General Information    Referring Physician Anibal Pedroza MD     History of present illness/functional impairment Pt is a 79 y.o. female presenting with a referring diagnosis of BL primary knee OA. She reports that her knee pain significantly worsened approximately 6 weeks ago, prompting her to seek treatment, although the pain has been present for several months. Pt received a series of 3 injections in each knee,  completed 2 weeks ago; which she reports have provided some relief for pain management. Currently,  "she describes her pain as 3/10 on the NPRS with activity and denies pain at rest. Pt reports difficulty with stair navigation and states that sitting relieves her symptoms. She lives in a 2-story home with 5 BRENNEN from the garage and works as a market research analyst. Pt enjoys going to the [employer] and states that her primary goal for PT is to \"feel more stable on my feet.\"                    Pain/Vitals - 09/12/24 1452          Pain Assessment    Currently in pain Yes     Preferred Pain Scale number (Numeric Rating Pain Scale)     Pain Side/Orientation bilateral     Pain: Body location Knee     Pain Rating (0-10): Pre Activity 0     Pain Rating (0-10): Activity 3     Pain Rating (0-10): Post Activity 0        Pre Activity Vital Signs    Pulse 72     SpO2 98 %     /84     BP Location Right upper arm     BP Method Manual     Patient Position Sitting        Pain Intervention    Intervention  assessed, TE, education     Post Intervention Comments monitored                    Falls/Food Screening - 09/12/24 1452          Initial Falls Assessment    One or more falls in the last year No        Food Insecurity    Within the past 12 months, you worried that your food would run out before you got the money to buy more. Never true     Within the past 12 months, the food you bought just didn't last and you didn't have money to get more. Never true                    PT - 09/12/24 1452          Physical Therapy    Physical Therapy [facility] PT        PT Plan    Frequency of treatment 2 times/week     PT Duration 8 weeks     PT Cert From 09/12/24     PT Cert To 11/11/24     Date PT POC was sent to provider 09/12/24     Signed PT Plan of Care received?  No                       Living Environment    Living Environment - 09/12/24 1452          Living Environment    People in Home alone     Living Arrangements house     Living Environment Comment 2 story home with 5STE+ BHR's                   PLOF:    Prior " Level of Function - 09/12/24 1452          OTHER    Previous level of function Fully independent. Drives. Works full time.                   ROM    Range of Motion - 09/12/24 1700          RIGHT: Lower Extremity AROM Assessment    Right LE AROM normal WNL     Knee Flexion   123     Knee Extension   -2   painful       LEFT: Lower Extremity AROM Assessment    Left LE AROM normal WNL     Knee Flexion   121     Knee Extension   -4                   MMT    Manual Muscle Tests - 09/12/24 1452          RIGHT: Lower Extremity Manual Muscle Test Assessment    Hip Flexion gross movement (4+/5) good plus     Hip Extension gross movement (4-/5) good minus     Hip Abduction gross movement (4-/5) good minus     Hip Adduction gross movement (4-/5) good minus     Knee Flexion strength (4+/5) good plus     Knee Extension strength (4+/5) good plus     Ankle Dorsiflexion gross movement (4+/5) good plus     Ankle Inversion gross movement (4+/5) good plus     Ankle Eversion gross movement (4+/5) good plus        LEFT: Lower Extremity Manual Muscle Test Assessment    Hip Flexion gross movement (4/5) good     Hip Extension gross movement (3+/5) fair plus   painful    Hip Abduction gross movement (3+/5) fair plus     Hip Adduction gross movement (3+/5) fair plus     Knee Flexion strength (4-/5) good minus   painful    Knee Extension strength (4+/5) good plus     Ankle Dorsiflexion gross movement (4/5) good     Ankle Inversion gross movement (4/5) good     Ankle Eversion gross movement (4/5) good                   Ortho Special Tests    Orthopedic Special Tests - 09/12/24 1700          Neurodynamic     SLR Left - Negative;Right - Negative                   Gait and Mobility    Gait and Mobility - 09/12/24 1452          Gait Training    Iron, Gait independent     Distance in Feet 100 feet        Stairs Assessment    Iron, Stairs modified independence     Assistive Device railing     Handrail Location (Stairs) left side  "(ascending);left side (descending)     Number of Steps (Stairs) 4     Ascending Stairs Technique step-over-step   painful    Descending Stairs Technique step-over-step   painful                  Outcome Measures    PT Outcome Measures - 09/12/24 1452          Objective Outcome Measures    Five Times to Sit to Stand (FTSTS) 12.8s w/o UE assist        Other Outcome Measures Used/Comments    Other outcome measure used: WOMAC: 20.7%        Timed Up and Go Test    Trial One: Timed Up and Go Test 8.1     Trial Two: Timed Up and Go Test 8.4     Trial Three: Timed Up and Go Test 8.4     Mean of 3 Trials: Timed Up and Go Test 8.3                      Goals          Patient Stated      PT Goals (pt-stated)       Patient stated:  \"I want to feel more steady on my feet.\"    SHORT TERM GOALS:  Pt reports pain levels < 3 /10 to perform ADLs.  Pt will demonstrate increase BL knee and hip strength > 3+ /5 to improve functional mobility. In 4 weeks.  Pt performs 5xSTS in <12s in order to improve functional mobility. In 4 weeks.  Pt demonstrates  I with HEP as instructed. In 4 weeks.  Pt WOMAC score < 20%. In 4 weeks.    LONG TERM GOALS:  Pt reports pain levels < 2 /10 to perform ADLs.  Pt will demonstrate increase BL knee strength > 4 /5 to improve functional mobility. In 8 weeks.  Pt demonstrates  I with HEP as instructed. In 8 weeks.  Pt WOMAC score < 15%. In 8 weeks.  Pt performs stair ascend/ descend  with reciprocal pattern I with handrail and NPRS <2/10. In 8 weeks.         Other      Mutually agreed upon pain goal       Mutually agreed upon pain goal: NPRS </= 2/10                TREATMENT PLAN:      DOS     IE 09/12/24   30 Day     60 Day     Precautions     Treatment G/Y/N Current Session Time   Modalities  CPT 50566 Total Time for Session Not performed   MHP     CP post exer   Manual   CPT 68068 Total Time for Session Not perfomed   STM/MFR/TPR STM and effleurage with retrograde massage to L/R post calf post HS and quad " "region   Instrument Assisted STM      Mobilizations L/R knee patellar mobs AP sup/inf gr II III   ROM/Flexibility L/R knee PROM    Therapeutic Exercise  CPT 18196 Total Time for Session 8-22 Minutes    Sets Reps Load Comment    pt ed         Pt educated in HEP per written materials; pt verbalized understanding.      NUSTEP              RB                            active HS stretch in supine  Y 2 10   HEP instructed                 standing knee flex at step 1st/2nd             heel cord stretch             standing HS stretch at step                           STRENGTH             SAQ Y 2 10 3\"H HEP instructed   QS SLR Y 2 10 3\"H HEP instructed   Supine clamshells Y 1 15 PTB HEP instructed   bridges             Progression to S/L clamshells             TAC progression             STS             bilat HR             step taps             step up progression             Standing hip abduction              Standing hip extension                           Neuromuscular Re-Education  CPT 75951 Total Time for Session Not performed    Sets Reps Load Comment   circuit balance act             lateral side step             for/back walk // bars                                           Gait  CPT 71499 Total Time for Session Not performed   gait training without AD     stair training to progressive recip steps     Therapeutic Activity  CPT 23555 Total Time for Session 8-22 Minutes   Pt education Educated patient on examination findings, impairments identified, dx, prognosis and PT Plan of Care; Pt educated in HEP per written materials; pt verbalized understanding.            Group  CPT 24509 Total Time for Session Not performed      Abbreviation Legend   OTB - Orange theraband   PTB - Pink theraband   GTB - Green theraband   BTB - Blue theraband   HT - Head turns   HN - Head nods   ST - Semi tandem   EO - Eyes open   EC - Eyes closed         ASSESSMENT:    This 79 y.o. year old female presents to PT with above stated " diagnosis. Physical Therapy evaluation reveals decreased strength, pain resulting in home management, community/leisure limitations. Rafi Baird will benefit from skilled PT services to address limitation, work towards rehab and patient goals and maximize PLOF of chosen ADLs.     Planned Services: The patient's treatment will include CPT 36718 Gait training, CPT 34307 Manual therapy, CPT 54856 Neuromuscular Reeducation, CPT 72044 Therapeutic activities, CPT 69085 Therapeutic exercises, CPT 94525 Electrical stimulation ATTENDED, CPT 18514 Hot/Cold Packs therapy, .     I attest that I was present for the entire session, directed all activities, made all clinical decisions, and was responsible for all assessments including the review of medications, allergies and history.    Kareen Barnes PT DPT

## 2024-09-17 ENCOUNTER — HOSPITAL ENCOUNTER (OUTPATIENT)
Dept: PHYSICAL THERAPY | Facility: CLINIC | Age: 79
Setting detail: THERAPIES SERIES
Discharge: HOME | End: 2024-09-17
Attending: FAMILY MEDICINE
Payer: COMMERCIAL

## 2024-09-17 DIAGNOSIS — M17.11 UNILATERAL PRIMARY OSTEOARTHRITIS, RIGHT KNEE: Primary | ICD-10-CM

## 2024-09-17 DIAGNOSIS — M17.12 UNILATERAL PRIMARY OSTEOARTHRITIS, LEFT KNEE: ICD-10-CM

## 2024-09-17 PROCEDURE — 97110 THERAPEUTIC EXERCISES: CPT | Mod: GP

## 2024-09-17 NOTE — OP PT TREATMENT LOG
"  DOS M17.11 (ICD-10-CM) - Unilateral primary osteoarthritis, right knee  M17.12 (ICD-10-CM) - Unilateral primary osteoarthritis, left knee   IE 09/12/24   30 Day     60 Day     Precautions    Treatment G/Y/N Current Session Time   Modalities  CPT 88467 Total Time for Session Not performed   MHP     CP post exer   Manual   CPT 12923 Total Time for Session Not perfomed   STM/MFR/TPR STM and effleurage with retrograde massage to L/R post calf post HS and quad region   Instrument Assisted STM      Mobilizations L/R knee patellar mobs AP sup/inf gr II III   ROM/Flexibility L/R knee PROM    Therapeutic Exercise  CPT 55458 Total Time for Session 53-60 Minutes    Sets Reps Load Comment    pt ed  y       Pt educated in HEP per written materials; pt verbalized understanding.      NUSTEP  y        level 1 8 min    RB                            active HS stretch in supine  Y 2 10   HEP instructed                 standing knee flex at step 1st/2nd          heel cord stretch             standing HS stretch at step                           STRENGTH             SAQ Y 3 10 3\"H HEP    QS SLR Y 3 10 3\"H HEP    Supine clamshells Y 3 10 PTB HEP    bridges y 2 10  HEP   Progression to S/L clamshells        TAC progression:  TAC hip add  TAC march  TAC table top LE lowering   y   10      STS y 10   HEP   bilat HR y 2 10  HEP   unilat HR y  5ea  HEP   step taps        step up progression             Standing hip abduction              Standing hip extension                      Neuromuscular Re-Education  CPT 39316 Total Time for Session Not performed    Sets Reps Load Comment   circuit balance act             lateral side step             for/back walk // bars                                           Gait  CPT 98638 Total Time for Session Not performed   gait training without AD     stair training to progressive recip steps     Therapeutic Activity  CPT 44954 Total Time for Session Not performed   Pt education Educated patient on " examination findings, impairments identified, dx, prognosis and PT Plan of Care; Pt educated in HEP per written materials; pt verbalized understanding.            Group  CPT 85399 Total Time for Session Not performed            Abbreviation Legend   OTB - Orange theraband   PTB - Pink theraband   GTB - Green theraband   BTB - Blue theraband   HT - Head turns   HN - Head nods   ST - Semi tandem   EO - Eyes open   EC - Eyes closed

## 2024-09-17 NOTE — PROGRESS NOTES
"Physical Therapy Visit    PT DAILY NOTE FOR OUTPATIENT THERAPY    Patient: Rafi Baird MRN: [id number]  : 1945 79 y.o.  Referring Physician: Anibal Pedroza MD  Date of Visit: 2024    Certification Dates: 24 through 24    Diagnosis:   1. Unilateral primary osteoarthritis, right knee    2. Unilateral primary osteoarthritis, left knee        Chief Complaints:  B knee OA    Precautions:   Existing Precautions/Restrictions: no known precautions/restrictions      TODAY'S VISIT    Time In Session:  Start Time: 1503  Stop Time: 1558  Time Calculation (min): 55 min   History/Vitals/Pain/Encounter Info - 24 1503          Injury History/Precautions/Daily Required Info    Document Type daily treatment     Primary Therapist Rachel Barnes PT     Chief Complaint/Reason for Visit  B knee OA     Referring Physician Anibal Pedroza MD     Existing Precautions/Restrictions no known precautions/restrictions     History of present illness/functional impairment Pt is a 79 y.o. female presenting with a referring diagnosis of BL primary knee OA. She reports that her knee pain significantly worsened approximately 6 weeks ago, prompting her to seek treatment, although the pain has been present for several months. Pt received a series of 3 injections a few months ago, which she reports have provided some relief for pain management. Currently, she describes her pain as 3/10 on the NPRS with activity and denies pain at rest. Pt reports difficulty with stair navigation and states that sitting relieves her symptoms. She lives in a 2-story home with 5 BRENNEN from the [facility] and works as a market research analyst. Pt enjoys going to the [employer] and states that her primary goal for PT is to \"feel more stable on my feet.\"     Patient/Family/Caregiver Comments/Observations pt reports compliance with HEP, difficulty getting up from doing them on the floor, recommended trial on the bed     Patient reported fall since " last visit No        Pain Assessment    Currently in pain Yes     Preferred Pain Scale word (verbal rating pain scale)     Word Pain Rating: Rest 2 - mild pain     Word Pain Rating: Activity 2 - mild pain     Pain Description intermittent     Pain Onset/Duration intermittent sharper pains, other wise low level achiness        Pain Intervention    Intervention  TE     Post Intervention Comments monitored                    Daily Treatment Assessment and Plan - 09/17/24 1503          Daily Treatment Assessment and Plan    Progress toward goals Progressing     Daily Outcome Summary pt reports compliance with HEP, notes difficulty getting on/off floor for HEP, we discussed trial on the bed  initiated session with mat series exercises, cues for proper pacing and positioning  progressed exercises as noted, pt will benefit from continued hip and core strengthening   upgraded HEP per written materials, educated pt to cont HEP  pt denies any increased pain/sx with activities performed this session  Next session plan progression of strengthening and NMRE activities as able     Plan and Recommendations Next session plan progression of strengthening and NMRE activities as able                         OBJECTIVE DATA TAKEN TODAY:    None taken    Today's Treatment:      DOS M17.11 (ICD-10-CM) - Unilateral primary osteoarthritis, right knee  M17.12 (ICD-10-CM) - Unilateral primary osteoarthritis, left knee   IE 09/12/24   30 Day     60 Day     Precautions    Treatment G/Y/N Current Session Time   Modalities  CPT 49887 Total Time for Session Not performed   MHP     CP post exer   Manual   CPT 27835 Total Time for Session Not perfomed   STM/MFR/TPR STM and effleurage with retrograde massage to L/R post calf post HS and quad region   Instrument Assisted STM      Mobilizations L/R knee patellar mobs AP sup/inf gr II III   ROM/Flexibility L/R knee PROM    Therapeutic Exercise  CPT 07724 Total Time for Session 53-60 Minutes    Sets Reps  "Load Comment    pt ed  y       Pt educated in HEP per written materials; pt verbalized understanding.      NUSTEP  y        level 1 8 min    RB                            active HS stretch in supine  Y 2 10   HEP instructed                 standing knee flex at step 1st/2nd          heel cord stretch             standing HS stretch at step                           STRENGTH             SAQ Y 3 10 3\"H HEP    QS SLR Y 3 10 3\"H HEP    Supine clamshells Y 3 10 PTB HEP    bridges y 2 10  HEP   Progression to S/L clamshells        TAC progression:  TAC hip add  TAC march  TAC table top LE lowering   y   10      STS y 10   HEP   bilat HR y 2 10  HEP   unilat HR y  5ea  HEP   step taps        step up progression             Standing hip abduction              Standing hip extension                      Neuromuscular Re-Education  CPT 10452 Total Time for Session Not performed    Sets Reps Load Comment   circuit balance act             lateral side step             for/back walk // bars                                           Gait  CPT 31311 Total Time for Session Not performed   gait training without AD     stair training to progressive recip steps     Therapeutic Activity  CPT 28809 Total Time for Session Not performed   Pt education Educated patient on examination findings, impairments identified, dx, prognosis and PT Plan of Care; Pt educated in HEP per written materials; pt verbalized understanding.            Group  CPT 80512 Total Time for Session Not performed            Abbreviation Legend   OTB - Orange theraband   PTB - Pink theraband   GTB - Green theraband   BTB - Blue theraband   HT - Head turns   HN - Head nods   ST - Semi tandem   EO - Eyes open   EC - Eyes closed                                                                    "

## 2024-09-18 NOTE — ADDENDUM NOTE
Encounter addended by: Kareen Barnes PT on: 9/18/2024 10:21 AM   Actions taken: Clinical Note Signed

## 2024-09-19 ENCOUNTER — HOSPITAL ENCOUNTER (OUTPATIENT)
Dept: PHYSICAL THERAPY | Facility: CLINIC | Age: 79
Setting detail: THERAPIES SERIES
Discharge: HOME | End: 2024-09-19
Attending: FAMILY MEDICINE
Payer: COMMERCIAL

## 2024-09-19 DIAGNOSIS — M17.12 UNILATERAL PRIMARY OSTEOARTHRITIS, LEFT KNEE: ICD-10-CM

## 2024-09-19 DIAGNOSIS — M17.11 UNILATERAL PRIMARY OSTEOARTHRITIS, RIGHT KNEE: Primary | ICD-10-CM

## 2024-09-19 PROCEDURE — 97110 THERAPEUTIC EXERCISES: CPT | Mod: GP

## 2024-09-19 NOTE — PROGRESS NOTES
"Physical Therapy Visit    PT DAILY NOTE FOR OUTPATIENT THERAPY    Patient: Rafi Baird MRN: [id number]  : 1945 79 y.o.  Referring Physician: Anibal Pedroza MD  Date of Visit: 2024    Certification Dates: 24 through 24    Diagnosis:   1. Unilateral primary osteoarthritis, right knee    2. Unilateral primary osteoarthritis, left knee        Chief Complaints:  B knee OA    Precautions:   Existing Precautions/Restrictions: no known precautions/restrictions      TODAY'S VISIT    Time In Session:  Start Time: 1359  Stop Time: 1453  Time Calculation (min): 54 min   History/Vitals/Pain/Encounter Info - 24 1359          Injury History/Precautions/Daily Required Info    Document Type daily treatment     Primary Therapist Rachel Barnes PT     Chief Complaint/Reason for Visit  B knee OA     Referring Physician Anibal Pedroza MD     Existing Precautions/Restrictions no known precautions/restrictions     History of present illness/functional impairment Pt is a 79 y.o. female presenting with a referring diagnosis of BL primary knee OA. She reports that her knee pain significantly worsened approximately 6 weeks ago, prompting her to seek treatment, although the pain has been present for several months. Pt received a series of 3 injections a few months ago, which she reports have provided some relief for pain management. Currently, she describes her pain as 3/10 on the NPRS with activity and denies pain at rest. Pt reports difficulty with stair navigation and states that sitting relieves her symptoms. She lives in a 2-story home with 5 BRENNEN from the [facility] and works as a market research analyst. Pt enjoys going to the [employer] and states that her primary goal for PT is to \"feel more stable on my feet.\"     Patient/Family/Caregiver Comments/Observations pt reports mild soreness after last session     Patient reported fall since last visit No        Pain Assessment    Currently in pain Yes     " Preferred Pain Scale word (verbal rating pain scale)     Pain Side/Orientation bilateral     Pain: Body location Knee     Word Pain Rating: Rest 2 - mild pain     Word Pain Rating: Activity 2 - mild pain     Pain Description intermittent        Pain Intervention    Intervention  TE     Post Intervention Comments monitored                    Daily Treatment Assessment and Plan - 09/19/24 1359          Daily Treatment Assessment and Plan    Progress toward goals Progressing     Daily Outcome Summary pt reports mild soreness intermittently B knees  initiated with mat series exercise, demonstrates good carryover with HEP; demo improved SLR with decr lag noted.  pt performed standing activities with advancement of strengthening activities; pt denies any increased pain/sx with activities performed this session  demo difficulty with STS activity, tends to add LEs for stability and with side step demonstrates hip drop more signif L>R side. Will benefit from strengthening hip abductors, extensors and abdominals to improve stability and reduce stress to medial knees with flexion activities.  Pt will benefit from continued skilled Physical Therapy visits to work with focus on strengthening to decrease pain with functional mobility.     Plan and Recommendations Next session plan progression of strengthening and NMRE activities as able                         OBJECTIVE DATA TAKEN TODAY:    None taken    Today's Treatment:      DOS M17.11 (ICD-10-CM) - Unilateral primary osteoarthritis, right knee  M17.12 (ICD-10-CM) - Unilateral primary osteoarthritis, left knee   IE 09/12/24   30 Day     60 Day     Precautions    Treatment G/Y/N Current Session Time   Modalities  CPT 54040 Total Time for Session Not performed   MHP     CP post exer   Manual   CPT 88588 Total Time for Session Not perfomed   STM/MFR/TPR STM and effleurage with retrograde massage to L/R post calf post HS and quad region   Instrument Assisted STM      Mobilizations  "L/R knee patellar mobs AP sup/inf gr II III   ROM/Flexibility L/R knee PROM    Therapeutic Exercise  CPT 24803 Total Time for Session 53-60 Minutes    Sets Reps Load Comment    pt ed  y       Pt educated in HEP per written materials; pt verbalized understanding.      NUSTEP  y        level 1 8 min    RB                            active HS stretch in supine  Y 2 10   HEP instructed                 standing knee flex at step 1st/2nd          heel cord stretch  y  5  10\"       standing HS stretch at step                           STRENGTH             SAQ Y 3 10 3\"H HEP    QS SLR Y 3 10 3\"H HEP    Supine clamshells Y 3 10 PTB HEP    bridges y 3 10 ball btwn knees HEP   Progression to S/L clamshells        TAC progression:  TAC hip add  TAC march  TAC table top LE lowering   y  y   10  10              STS y 10 2  HEP   bilat HR y 2 10  HEP   unilat HR y  5ea  HEP   step taps y  10 ea 6\" lateral   step up progression             Standing hip abduction              Standing hip extension                      Neuromuscular Re-Education  CPT 95300 Total Time for Session Not performed    Sets Reps Load Comment   circuit balance act             lateral side step             for/back walk // bars                                           Gait  CPT 31288 Total Time for Session Not performed   gait training without AD     stair training to progressive recip steps     Therapeutic Activity  CPT 71997 Total Time for Session Not performed   Pt education Educated patient on examination findings, impairments identified, dx, prognosis and PT Plan of Care; Pt educated in HEP per written materials; pt verbalized understanding.            Group  CPT 69818 Total Time for Session Not performed              Abbreviation Legend   OTB - Orange theraband   PTB - Pink theraband   GTB - Green theraband   BTB - Blue theraband   HT - Head turns   HN - Head nods   ST - Semi tandem   EO - Eyes open   EC - Eyes closed                              "

## 2024-09-19 NOTE — OP PT TREATMENT LOG
"  DOS M17.11 (ICD-10-CM) - Unilateral primary osteoarthritis, right knee  M17.12 (ICD-10-CM) - Unilateral primary osteoarthritis, left knee   IE 09/12/24   30 Day     60 Day     Precautions    Treatment G/Y/N Current Session Time   Modalities  CPT 56119 Total Time for Session Not performed   MHP     CP post exer   Manual   CPT 42973 Total Time for Session Not perfomed   STM/MFR/TPR STM and effleurage with retrograde massage to L/R post calf post HS and quad region   Instrument Assisted STM      Mobilizations L/R knee patellar mobs AP sup/inf gr II III   ROM/Flexibility L/R knee PROM    Therapeutic Exercise  CPT 76834 Total Time for Session 53-60 Minutes    Sets Reps Load Comment    pt ed  y       Pt educated in HEP per written materials; pt verbalized understanding.      NUSTEP  y        level 1 8 min    RB                            active HS stretch in supine  Y 2 10   HEP instructed                 standing knee flex at step 1st/2nd          heel cord stretch  y  5  10\"       standing HS stretch at step                           STRENGTH             SAQ Y 3 10 3\"H HEP    QS SLR Y 3 10 3\"H HEP    Supine clamshells Y 3 10 PTB HEP    bridges y 3 10 ball btwn knees HEP   Progression to S/L clamshells        TAC progression:  TAC hip add  TAC march  TAC table top LE lowering   y  y   10  10              STS y 10 2  HEP   bilat HR y 2 10  HEP   unilat HR y  5ea  HEP   step taps y  10 ea 6\" lateral   step up progression             Standing hip abduction              Standing hip extension                      Neuromuscular Re-Education  CPT 32220 Total Time for Session Not performed    Sets Reps Load Comment   circuit balance act             lateral side step             for/back walk // bars                                           Gait  CPT 80214 Total Time for Session Not performed   gait training without AD     stair training to progressive recip steps     Therapeutic Activity  CPT 12901 Total Time for Session Not " performed   Pt education Educated patient on examination findings, impairments identified, dx, prognosis and PT Plan of Care; Pt educated in HEP per written materials; pt verbalized understanding.            Group  CPT 24347 Total Time for Session Not performed              Abbreviation Legend   OTB - Orange theraband   PTB - Pink theraband   GTB - Green theraband   BTB - Blue theraband   HT - Head turns   HN - Head nods   ST - Semi tandem   EO - Eyes open   EC - Eyes closed

## 2024-09-26 ENCOUNTER — HOSPITAL ENCOUNTER (OUTPATIENT)
Dept: PHYSICAL THERAPY | Facility: CLINIC | Age: 79
Setting detail: THERAPIES SERIES
Discharge: HOME | End: 2024-09-26
Attending: FAMILY MEDICINE
Payer: COMMERCIAL

## 2024-09-26 DIAGNOSIS — M17.12 UNILATERAL PRIMARY OSTEOARTHRITIS, LEFT KNEE: ICD-10-CM

## 2024-09-26 DIAGNOSIS — M17.11 UNILATERAL PRIMARY OSTEOARTHRITIS, RIGHT KNEE: Primary | ICD-10-CM

## 2024-09-26 PROCEDURE — 97110 THERAPEUTIC EXERCISES: CPT | Mod: GP,CQ

## 2024-09-26 NOTE — PROGRESS NOTES
"Physical Therapy Visit    PT DAILY NOTE FOR OUTPATIENT THERAPY    Patient: Rafi Baird MRN: [id number]  : 1945 79 y.o.  Referring Physician: Anibal Pedroza MD  Date of Visit: 2024    Certification Dates: 24 through 24    Diagnosis:   1. Unilateral primary osteoarthritis, right knee    2. Unilateral primary osteoarthritis, left knee        Chief Complaints:  B knee OA    Precautions:   Existing Precautions/Restrictions: no known precautions/restrictions      TODAY'S VISIT    Time In Session:  Start Time: 1500  Stop Time: 1555  Time Calculation (min): 55 min   History/Vitals/Pain/Encounter Info - 24 1458          Injury History/Precautions/Daily Required Info    Document Type daily treatment     Primary Therapist Rachel Barnes PT     Chief Complaint/Reason for Visit  B knee OA     Referring Physician Anibal Pedroza MD     Existing Precautions/Restrictions no known precautions/restrictions     History of present illness/functional impairment Pt is a 79 y.o. female presenting with a referring diagnosis of BL primary knee OA. She reports that her knee pain significantly worsened approximately 6 weeks ago, prompting her to seek treatment, although the pain has been present for several months. Pt received a series of 3 injections a few months ago, which she reports have provided some relief for pain management. Currently, she describes her pain as 3/10 on the NPRS with activity and denies pain at rest. Pt reports difficulty with stair navigation and states that sitting relieves her symptoms. She lives in a 2-story home with 5 BRENNEN from the [facility] and works as a market research analyst. Pt enjoys going to the [employer] and states that her primary goal for PT is to \"feel more stable on my feet.\"     Patient/Family/Caregiver Comments/Observations pt reports to session after 2/5 hours of zoom meetings. pt reports pain with ascending/descending steps.     Patient reported fall since last " "visit No        Pain Assessment    Currently in pain Yes     Preferred Pain Scale word (verbal rating pain scale)     Pain Side/Orientation bilateral     Pain: Body location Knee     Word Pain Rating: Rest 0 - no pain     Word Pain Rating: Activity 4 - moderate pain        Pain Intervention    Intervention  TE     Post Intervention Comments monitored, see assessment         Services    Do You Speak a Language Other Than English at Home? no                    Daily Treatment Assessment and Plan - 09/26/24 1458          Daily Treatment Assessment and Plan    Progress toward goals Progressing     Daily Outcome Summary Pt reports pain occurs at random times, denies constant pain or specific movements to reproduce pain. Pt reports compliance with HEP. Pt reports STS difficult to perform previous session, \"only time i felt a little pain.\" Began session with active warmup on NuStep, followed with TE program. Focus on proximal hip and core strengthening. Increased sets for TAC hip add with no report of increase pain or fatigue. Cues to avoid knee valgus with STS and step taps, tactile cues utilized. Pt denies tactile cues improving discomfort with STS. Pt denies pain or fatigue end of session.     Plan and Recommendations Next session plan progression of strengthening and NMRE activities as able                         OBJECTIVE DATA TAKEN TODAY:    None taken    Today's Treatment:      DOS M17.11 (ICD-10-CM) - Unilateral primary osteoarthritis, right knee  M17.12 (ICD-10-CM) - Unilateral primary osteoarthritis, left knee   IE 09/12/24   30 Day     60 Day     Precautions    Treatment G/Y/N Current Session Time   Modalities  CPT 66059 Total Time for Session Not performed   MHP     CP post exer   Manual   CPT 50377 Total Time for Session Not perfomed   STM/MFR/TPR STM and effleurage with retrograde massage to L/R post calf post HS and quad region   Instrument Assisted STM      Mobilizations L/R knee patellar mobs " "AP sup/inf gr II III   ROM/Flexibility L/R knee PROM    Therapeutic Exercise  CPT 48774 Total Time for Session 53-67 Minutes    Sets Reps Load Comment    pt ed  y       Pt educated in HEP per written materials; pt verbalized understanding.      NUSTEP  y        level 1 8 min    RB                            active HS stretch in supine  Y 2 10   HEP instructed                 standing knee flex at step 1st/2nd          heel cord stretch  y  5  10\"       standing HS stretch at step                           STRENGTH             SAQ Y 3 10 3\"H HEP    QS SLR Y 3 10 3\"H HEP    Supine clamshells Y 3 10 PTB HEP    bridges y 3 10 ball btwn knees HEP   Progression to S/L clamshells        TAC progression:  TAC hip add  TAC march  TAC table top LE lowering   y  y   10  10   2             STS y 10 2  HEP   bilat HR y 2 10  HEP   unilat HR y  5ea  HEP   step taps y  10 ea 6\" lateral   step up progression             Standing hip abduction              Standing hip extension                      Neuromuscular Re-Education  CPT 01692 Total Time for Session Not performed    Sets Reps Load Comment   circuit balance act             lateral side step             for/back walk // bars                                           Gait  CPT 60632 Total Time for Session Not performed   gait training without AD     stair training to progressive recip steps     Therapeutic Activity  CPT 99129 Total Time for Session Not performed   Pt education Educated patient on examination findings, impairments identified, dx, prognosis and PT Plan of Care; Pt educated in HEP per written materials; pt verbalized understanding.            Group  CPT 37939 Total Time for Session Not performed              Abbreviation Legend   OTB - Orange theraband   PTB - Pink theraband   GTB - Green theraband   BTB - Blue theraband   HT - Head turns   HN - Head nods   ST - Semi tandem   EO - Eyes open   EC - Eyes closed                              "

## 2024-09-26 NOTE — OP PT TREATMENT LOG
"  DOS M17.11 (ICD-10-CM) - Unilateral primary osteoarthritis, right knee  M17.12 (ICD-10-CM) - Unilateral primary osteoarthritis, left knee   IE 09/12/24   30 Day     60 Day     Precautions    Treatment G/Y/N Current Session Time   Modalities  CPT 18791 Total Time for Session Not performed   MHP     CP post exer   Manual   CPT 96277 Total Time for Session Not perfomed   STM/MFR/TPR STM and effleurage with retrograde massage to L/R post calf post HS and quad region   Instrument Assisted STM      Mobilizations L/R knee patellar mobs AP sup/inf gr II III   ROM/Flexibility L/R knee PROM    Therapeutic Exercise  CPT 36405 Total Time for Session 53-67 Minutes    Sets Reps Load Comment    pt ed  y       Pt educated in HEP per written materials; pt verbalized understanding.      NUSTEP  y        level 1 8 min    RB                            active HS stretch in supine  Y 2 10   HEP instructed                 standing knee flex at step 1st/2nd          heel cord stretch  y  5  10\"       standing HS stretch at step                           STRENGTH             SAQ Y 3 10 3\"H HEP    QS SLR Y 3 10 3\"H HEP    Supine clamshells Y 3 10 PTB HEP    bridges y 3 10 ball btwn knees HEP   Progression to S/L clamshells        TAC progression:  TAC hip add  TAC march  TAC table top LE lowering   y  y   10  10   2             STS y 10 2  HEP   bilat HR y 2 10  HEP   unilat HR y  5ea  HEP   step taps y  10 ea 6\" lateral   step up progression             Standing hip abduction              Standing hip extension                      Neuromuscular Re-Education  CPT 61200 Total Time for Session Not performed    Sets Reps Load Comment   circuit balance act             lateral side step             for/back walk // bars                                           Gait  CPT 60236 Total Time for Session Not performed   gait training without AD     stair training to progressive recip steps     Therapeutic Activity  CPT 78901 Total Time for Session " Not performed   Pt education Educated patient on examination findings, impairments identified, dx, prognosis and PT Plan of Care; Pt educated in HEP per written materials; pt verbalized understanding.            Group  CPT 15617 Total Time for Session Not performed              Abbreviation Legend   OTB - Orange theraband   PTB - Pink theraband   GTB - Green theraband   BTB - Blue theraband   HT - Head turns   HN - Head nods   ST - Semi tandem   EO - Eyes open   EC - Eyes closed

## 2024-10-01 ENCOUNTER — HOSPITAL ENCOUNTER (OUTPATIENT)
Dept: PHYSICAL THERAPY | Facility: CLINIC | Age: 79
Setting detail: THERAPIES SERIES
Discharge: HOME | End: 2024-10-01
Attending: FAMILY MEDICINE
Payer: COMMERCIAL

## 2024-10-01 DIAGNOSIS — M17.11 UNILATERAL PRIMARY OSTEOARTHRITIS, RIGHT KNEE: Primary | ICD-10-CM

## 2024-10-01 DIAGNOSIS — M17.12 UNILATERAL PRIMARY OSTEOARTHRITIS, LEFT KNEE: ICD-10-CM

## 2024-10-01 PROCEDURE — 97110 THERAPEUTIC EXERCISES: CPT | Mod: GP

## 2024-10-01 NOTE — OP PT TREATMENT LOG
"  DOS M17.11 (ICD-10-CM) - Unilateral primary osteoarthritis, right knee  M17.12 (ICD-10-CM) - Unilateral primary osteoarthritis, left knee   IE 09/12/24   30 Day     60 Day     Precautions    Treatment G/Y/N Current Session Time   Modalities  CPT 71986 Total Time for Session Not performed   MHP     CP post exer   Manual   CPT 46645 Total Time for Session Not perfomed   STM/MFR/TPR STM and effleurage with retrograde massage to L/R post calf post HS and quad region   Instrument Assisted STM      Mobilizations L/R knee patellar mobs AP sup/inf gr II III   ROM/Flexibility L/R knee PROM    Therapeutic Exercise  CPT 54054 Total Time for Session 53-67 Minutes    Sets Reps Load Comment    pt ed  y       Pt educated in HEP per written materials; pt verbalized understanding.      NUSTEP  y        level 2 8 min    RB                            active HS stretch in supine  Y 2 10   HEP instructed                 standing knee flex at step 1st/2nd          heel cord stretch  y  5  10\"       standing HS stretch at step                           STRENGTH             SAQ Y 3 10 3\"H HEP    QS SLR Y 3 10 3\"H HEP            bridges y 3 10 ball btwn knees HEP    S/L clamshells y 3 10 PTB    TAC progression:  TAC hip add  TAC march  table top  TAC table top LE lowering   y  y  y  y   10  10  3  2   2      8ea             lateral banded side step y  6 laps PTB    STS  10 2  HEP   bilat HR y 2 10  HEP   unilat HR y  5ea  HEP   step taps   10 ea 6\" lateral   step up progression             Standing hip abduction              Standing hip extension                      Neuromuscular Re-Education  CPT 29548 Total Time for Session Not performed    Sets Reps Load Comment   circuit balance act             lateral side step             for/back walk // bars                                           Gait  CPT 49835 Total Time for Session Not performed   gait training without AD     stair training to progressive recip steps     Therapeutic " Activity  CPT 71587 Total Time for Session Not performed   Pt education Educated patient on examination findings, impairments identified, dx, prognosis and PT Plan of Care; Pt educated in HEP per written materials; pt verbalized understanding.            Group  CPT 81846 Total Time for Session Not performed                      Abbreviation Legend   OTB - Orange theraband   PTB - Pink theraband   GTB - Green theraband   BTB - Blue theraband   HT - Head turns   HN - Head nods   ST - Semi tandem   EO - Eyes open   EC - Eyes closed

## 2024-10-01 NOTE — PROGRESS NOTES
"Physical Therapy Visit    PT DAILY NOTE FOR OUTPATIENT THERAPY    Patient: Rafi Baird MRN: [id number]  : 1945 79 y.o.  Referring Physician: Anibal Pedroza MD  Date of Visit: 10/1/2024    Certification Dates: 24 through 24    Diagnosis:   1. Unilateral primary osteoarthritis, right knee    2. Unilateral primary osteoarthritis, left knee        Chief Complaints:  B knee OA    Precautions:   Existing Precautions/Restrictions: no known precautions/restrictions      TODAY'S VISIT    Time In Session:  Start Time: 1203  Stop Time: 1258  Time Calculation (min): 55 min   History/Vitals/Pain/Encounter Info - 10/01/24 1204          Injury History/Precautions/Daily Required Info    Document Type daily treatment     Primary Therapist Rachle Barnes PT     Chief Complaint/Reason for Visit  B knee OA     Referring Physician Anibal Pedroza MD     Existing Precautions/Restrictions no known precautions/restrictions     History of present illness/functional impairment Pt is a 79 y.o. female presenting with a referring diagnosis of BL primary knee OA. She reports that her knee pain significantly worsened approximately 6 weeks ago, prompting her to seek treatment, although the pain has been present for several months. Pt received a series of 3 injections a few months ago, which she reports have provided some relief for pain management. Currently, she describes her pain as 3/10 on the NPRS with activity and denies pain at rest. Pt reports difficulty with stair navigation and states that sitting relieves her symptoms. She lives in a 2-story home with 5 BRENNEN from the [facility] and works as a market research analyst. Pt enjoys going to the [employer] and states that her primary goal for PT is to \"feel more stable on my feet.\"     Patient/Family/Caregiver Comments/Observations I noticed walking out to the driveway this am, I think there is a bit of improvement     Patient reported fall since last visit No        Pain " "Assessment    Currently in pain Yes     Preferred Pain Scale number (Numeric Rating Pain Scale)     Pain Side/Orientation bilateral     Pain Rating (0-10): Pre Activity 0     Pain Rating (0-10): Activity 5   with certain movements, can have incr pain \"it's random\"       Pain Intervention    Intervention  TE     Post Intervention Comments monitored         Services    Do You Speak a Language Other Than English at Home? no                    Daily Treatment Assessment and Plan - 10/01/24 1204          Daily Treatment Assessment and Plan    Progress toward goals Progressing     Daily Outcome Summary pt reports feels like knees are improving some, notes she felt better walking out in the driveway this am. Pt notes she still has some intermittent pains in the knee \" random\" but overall, improved  initiated with mat based exercises, pt demo improved quad engagement with decr lag noted with SLR. Advanced core strengthening with TRA engagement table tops and table top LE extensions; advanced hip strengthening to sidelying.  pt reports no incr pain levels with activities performed this session  Pt educated in HEP per written materials upgraded; pt verbalized understanding.  Next session plan progression of step ups and balance activities as able     Plan and Recommendations Next session plan progression of step ups and balance activities as able                         OBJECTIVE DATA TAKEN TODAY:    None taken    Today's Treatment:      DOS M17.11 (ICD-10-CM) - Unilateral primary osteoarthritis, right knee  M17.12 (ICD-10-CM) - Unilateral primary osteoarthritis, left knee   IE 09/12/24   30 Day     60 Day     Precautions    Treatment G/Y/N Current Session Time   Modalities  CPT 18286 Total Time for Session Not performed   MHP     CP post exer   Manual   CPT 00787 Total Time for Session Not perfomed   STM/MFR/TPR STM and effleurage with retrograde massage to L/R post calf post HS and quad region   Instrument " "Assisted STM      Mobilizations L/R knee patellar mobs AP sup/inf gr II III   ROM/Flexibility L/R knee PROM    Therapeutic Exercise  CPT 45710 Total Time for Session 53-67 Minutes    Sets Reps Load Comment    pt ed  y       Pt educated in HEP per written materials; pt verbalized understanding.      NUSTEP  y        level 2 8 min    RB                            active HS stretch in supine  Y 2 10   HEP instructed                 standing knee flex at step 1st/2nd          heel cord stretch  y  5  10\"       standing HS stretch at step                           STRENGTH             SAQ Y 3 10 3\"H HEP    QS SLR Y 3 10 3\"H HEP            bridges y 3 10 ball btwn knees HEP    S/L clamshells y 3 10 PTB    TAC progression:  TAC hip add  TAC march  table top  TAC table top LE lowering   y  y  y  y   10  10  3  2   2      8ea             lateral banded side step y  6 laps PTB    STS  10 2  HEP   bilat HR y 2 10  HEP   unilat HR y  5ea  HEP   step taps   10 ea 6\" lateral   step up progression             Standing hip abduction              Standing hip extension                      Neuromuscular Re-Education  CPT 48944 Total Time for Session Not performed    Sets Reps Load Comment   circuit balance act             lateral side step             for/back walk // bars                                           Gait  CPT 06212 Total Time for Session Not performed   gait training without AD     stair training to progressive recip steps     Therapeutic Activity  CPT 58955 Total Time for Session Not performed   Pt education Educated patient on examination findings, impairments identified, dx, prognosis and PT Plan of Care; Pt educated in HEP per written materials; pt verbalized understanding.            Group  CPT 85746 Total Time for Session Not performed                      Abbreviation Legend   OTB - Orange theraband   PTB - Pink theraband   GTB - Green theraband   BTB - Blue theraband   HT - Head turns   HN - Head nods   ST - " Semi tandem   EO - Eyes open   EC - Eyes closed

## 2024-10-03 ENCOUNTER — HOSPITAL ENCOUNTER (OUTPATIENT)
Dept: PHYSICAL THERAPY | Facility: CLINIC | Age: 79
Setting detail: THERAPIES SERIES
Discharge: HOME | End: 2024-10-03
Attending: FAMILY MEDICINE
Payer: COMMERCIAL

## 2024-10-03 DIAGNOSIS — M17.12 UNILATERAL PRIMARY OSTEOARTHRITIS, LEFT KNEE: ICD-10-CM

## 2024-10-03 DIAGNOSIS — M17.11 UNILATERAL PRIMARY OSTEOARTHRITIS, RIGHT KNEE: Primary | ICD-10-CM

## 2024-10-03 PROCEDURE — 97110 THERAPEUTIC EXERCISES: CPT | Mod: GP,CQ

## 2024-10-03 NOTE — PROGRESS NOTES
"Physical Therapy Visit    PT DAILY NOTE FOR OUTPATIENT THERAPY    Patient: Rafi Baird MRN: [id number]  : 1945 79 y.o.  Referring Physician: Anibal Pedroza MD  Date of Visit: 10/3/2024    Certification Dates: 24 through 24    Diagnosis:   1. Unilateral primary osteoarthritis, right knee    2. Unilateral primary osteoarthritis, left knee        Chief Complaints:  B knee OA    Precautions:   Existing Precautions/Restrictions: no known precautions/restrictions      TODAY'S VISIT    Time In Session:  Start Time: 1500  Stop Time: 1554  Time Calculation (min): 54 min   History/Vitals/Pain/Encounter Info - 10/03/24 1454          Injury History/Precautions/Daily Required Info    Document Type daily treatment     Primary Therapist Rachel Barnes PT     Chief Complaint/Reason for Visit  B knee OA     Referring Physician Anibal Pedroza MD     Existing Precautions/Restrictions no known precautions/restrictions     History of present illness/functional impairment Pt is a 79 y.o. female presenting with a referring diagnosis of BL primary knee OA. She reports that her knee pain significantly worsened approximately 6 weeks ago, prompting her to seek treatment, although the pain has been present for several months. Pt received a series of 3 injections a few months ago, which she reports have provided some relief for pain management. Currently, she describes her pain as 3/10 on the NPRS with activity and denies pain at rest. Pt reports difficulty with stair navigation and states that sitting relieves her symptoms. She lives in a 2-story home with 5 BRENNEN from the [facility] and works as a market research analyst. Pt enjoys going to the [employer] and states that her primary goal for PT is to \"feel more stable on my feet.\"     Patient/Family/Caregiver Comments/Observations pt reports to session denying changes, went to the dentist this morning.     Patient reported fall since last visit No        Pain Assessment    " "Currently in pain Yes     Preferred Pain Scale number (Numeric Rating Pain Scale)     Pain Side/Orientation bilateral     Pain: Body location Knee     Pain Rating (0-10): Pre Activity 0     Pain Rating (0-10): Activity 7   \"only lasts for a few seconds. sometimes it feels weak\"       Pain Intervention    Intervention  TE     Post Intervention Comments monitored, see assessment         Services    Do You Speak a Language Other Than English at Home? no                    Daily Treatment Assessment and Plan - 10/03/24 1454          Daily Treatment Assessment and Plan    Progress toward goals Progressing     Daily Outcome Summary Pt reports to session with pain occurring at random times, states pain does not linger, lasts for a few seconds, overall states they feel weak when pain occurs. Began session with active warmup on NuStep followed with TE program. Pt demo proper pacing and carryover with TE, improving maintaining core engagement. Pt expresses core fatigue 2nd set of table top LE lowering. Added step ups, pt denies increase in pain, states feels the muscles around the knees. Cues for positioning. Pt states \"i feel fine\" denies pain or fatigue end of session.     Plan and Recommendations pt scheduled for progress note next session                         OBJECTIVE DATA TAKEN TODAY:    None taken    Today's Treatment:      DOS M17.11 (ICD-10-CM) - Unilateral primary osteoarthritis, right knee  M17.12 (ICD-10-CM) - Unilateral primary osteoarthritis, left knee   IE 09/12/24   30 Day     60 Day     Precautions    Treatment G/Y/N Current Session Time   Modalities  CPT 58087 Total Time for Session Not performed   MHP     CP post exer   Manual   CPT 40130 Total Time for Session Not perfomed   STM/MFR/TPR STM and effleurage with retrograde massage to L/R post calf post HS and quad region   Instrument Assisted STM      Mobilizations L/R knee patellar mobs AP sup/inf gr II III   ROM/Flexibility L/R knee PROM  " "  Therapeutic Exercise  CPT 10894 Total Time for Session 53-67 Minutes    Sets Reps Load Comment    pt ed  y       Pt educated in HEP per written materials; pt verbalized understanding.      NUSTEP  y        level 2 8 min    RB                            active HS stretch in supine  Y 2 10   HEP instructed                 standing knee flex at step 1st/2nd          heel cord stretch  y  5  10\"       standing HS stretch at step                           STRENGTH             SAQ Y 3 10 3\"H HEP    QS SLR Y 3 10 3\"H HEP            bridges y 3 10 ball btwn knees HEP    S/L clamshells y 3 10 PTB    TAC progression:  TAC hip add  TAC march  table top  TAC table top LE lowering   y  y  y  y   10  10  3  2   2      8ea             lateral banded side step y  6 laps PTB    STS  10 2  HEP   bilat HR y 2 10  HEP   unilat HR y  5ea  HEP   step taps   10 ea 6\" lateral   step up progression  y 1 10 ea    6\" step   Standing hip abduction              Standing hip extension                      Neuromuscular Re-Education  CPT 92228 Total Time for Session Not performed    Sets Reps Load Comment   circuit balance act             lateral side step             for/back walk // bars                                           Gait  CPT 25280 Total Time for Session Not performed   gait training without AD     stair training to progressive recip steps     Therapeutic Activity  CPT 55403 Total Time for Session Not performed   Pt education Educated patient on examination findings, impairments identified, dx, prognosis and PT Plan of Care; Pt educated in HEP per written materials; pt verbalized understanding.            Group  CPT 80388 Total Time for Session Not performed                      Abbreviation Legend   OTB - Orange theraband   PTB - Pink theraband   GTB - Green theraband   BTB - Blue theraband   HT - Head turns   HN - Head nods   ST - Semi tandem   EO - Eyes open   EC - Eyes closed                              "

## 2024-10-03 NOTE — OP PT TREATMENT LOG
"  DOS M17.11 (ICD-10-CM) - Unilateral primary osteoarthritis, right knee  M17.12 (ICD-10-CM) - Unilateral primary osteoarthritis, left knee   IE 09/12/24   30 Day     60 Day     Precautions    Treatment G/Y/N Current Session Time   Modalities  CPT 57812 Total Time for Session Not performed   MHP     CP post exer   Manual   CPT 09097 Total Time for Session Not perfomed   STM/MFR/TPR STM and effleurage with retrograde massage to L/R post calf post HS and quad region   Instrument Assisted STM      Mobilizations L/R knee patellar mobs AP sup/inf gr II III   ROM/Flexibility L/R knee PROM    Therapeutic Exercise  CPT 84511 Total Time for Session 53-67 Minutes    Sets Reps Load Comment    pt ed  y       Pt educated in HEP per written materials; pt verbalized understanding.      NUSTEP  y        level 2 8 min    RB                            active HS stretch in supine  Y 2 10   HEP instructed                 standing knee flex at step 1st/2nd          heel cord stretch  y  5  10\"       standing HS stretch at step                           STRENGTH             SAQ Y 3 10 3\"H HEP    QS SLR Y 3 10 3\"H HEP            bridges y 3 10 ball btwn knees HEP    S/L clamshells y 3 10 PTB    TAC progression:  TAC hip add  TAC march  table top  TAC table top LE lowering   y  y  y  y   10  10  3  2   2      8ea             lateral banded side step y  6 laps PTB    STS  10 2  HEP   bilat HR y 2 10  HEP   unilat HR y  5ea  HEP   step taps   10 ea 6\" lateral   step up progression  y 1 10 ea    6\" step   Standing hip abduction              Standing hip extension                      Neuromuscular Re-Education  CPT 24066 Total Time for Session Not performed    Sets Reps Load Comment   circuit balance act             lateral side step             for/back walk // bars                                           Gait  CPT 34907 Total Time for Session Not performed   gait training without AD     stair training to progressive recip steps   "   Therapeutic Activity  CPT 38115 Total Time for Session Not performed   Pt education Educated patient on examination findings, impairments identified, dx, prognosis and PT Plan of Care; Pt educated in HEP per written materials; pt verbalized understanding.            Group  CPT 74226 Total Time for Session Not performed                      Abbreviation Legend   OTB - Orange theraband   PTB - Pink theraband   GTB - Green theraband   BTB - Blue theraband   HT - Head turns   HN - Head nods   ST - Semi tandem   EO - Eyes open   EC - Eyes closed

## 2024-10-08 ENCOUNTER — HOSPITAL ENCOUNTER (OUTPATIENT)
Dept: PHYSICAL THERAPY | Facility: CLINIC | Age: 79
Setting detail: THERAPIES SERIES
Discharge: HOME | End: 2024-10-08
Attending: FAMILY MEDICINE
Payer: COMMERCIAL

## 2024-10-08 DIAGNOSIS — M17.12 UNILATERAL PRIMARY OSTEOARTHRITIS, LEFT KNEE: ICD-10-CM

## 2024-10-08 DIAGNOSIS — M17.11 UNILATERAL PRIMARY OSTEOARTHRITIS, RIGHT KNEE: Primary | ICD-10-CM

## 2024-10-08 PROCEDURE — 97530 THERAPEUTIC ACTIVITIES: CPT | Mod: GP

## 2024-10-08 PROCEDURE — 97110 THERAPEUTIC EXERCISES: CPT | Mod: GP

## 2024-10-08 ASSESSMENT — GAIT ASSESSMENTS: TUG TIME: 8

## 2024-10-08 NOTE — OP PT TREATMENT LOG
"  DOS M17.11 (ICD-10-CM) - Unilateral primary osteoarthritis, right knee  M17.12 (ICD-10-CM) - Unilateral primary osteoarthritis, left knee   IE 09/12/24   30 Day     60 Day     Precautions    Treatment G/Y/N Current Session Time   Modalities  CPT 33694 Total Time for Session Not performed   MHP     CP post exer   Manual   CPT 91792 Total Time for Session Not perfomed   STM/MFR/TPR STM and effleurage with retrograde massage to L/R post calf post HS and quad region   Instrument Assisted STM      Mobilizations L/R knee patellar mobs AP sup/inf gr II III   ROM/Flexibility L/R knee PROM    Therapeutic Exercise  CPT 86342 Total Time for Session 38-52 Minutes    Sets Reps Load Comment    pt ed  y       Pt educated in HEP per written materials; pt verbalized understanding.      NUSTEP  y        level 2 8 min    RB                            active HS stretch in supine  Y 2 10   HEP instructed                 standing knee flex at step 1st/2nd          heel cord stretch  y  5  10\"       standing HS stretch at step                           STRENGTH             SAQ Y 3 10 3\"H HEP    QS SLR Y 3 10 3\"H HEP            bridges y 3 10 ball btwn knees HEP    S/L clamshells y 3 10 PTB    TAC progression:  TAC hip add  table top  TAC table top LE lowering   y  y  y   10  3  2   2      8ea             lateral banded side step   6 laps PTB    STS  10 2  HEP   bilat HR y 2 10  HEP   unilat HR y  5ea  HEP   step taps   10 ea 6\" lateral   step up progression  1 10 ea    6\" step   Standing hip abduction   y  2  10  OTB     Standing hip extension y 2 10 OTB                  Neuromuscular Re-Education  CPT 27861 Total Time for Session Not performed    Sets Reps Load Comment   circuit balance act             lateral side step             for/back walk // bars                                           Gait  CPT 73736 Total Time for Session Not performed   gait training without AD     stair training to progressive recip steps     Therapeutic " Activity  CPT 13279 Total Time for Session 8-22 min   Pt education Educated patient on examination findings, impairments identified, dx, progress toward goals and PT Plan of Care; Pt educated in HEP per written materials; pt verbalized understanding.       assess  MMT WOMAC TUG 5x STS   Group  CPT 77399 Total Time for Session Not performed                        Abbreviation Legend   OTB - Orange theraband   PTB - Pink theraband   GTB - Green theraband   BTB - Blue theraband   HT - Head turns   HN - Head nods   ST - Semi tandem   EO - Eyes open   EC - Eyes closed

## 2024-10-10 ENCOUNTER — HOSPITAL ENCOUNTER (OUTPATIENT)
Dept: PHYSICAL THERAPY | Facility: CLINIC | Age: 79
Setting detail: THERAPIES SERIES
Discharge: HOME | End: 2024-10-10
Attending: FAMILY MEDICINE
Payer: COMMERCIAL

## 2024-10-10 DIAGNOSIS — M17.12 UNILATERAL PRIMARY OSTEOARTHRITIS, LEFT KNEE: ICD-10-CM

## 2024-10-10 DIAGNOSIS — M17.11 UNILATERAL PRIMARY OSTEOARTHRITIS, RIGHT KNEE: Primary | ICD-10-CM

## 2024-10-10 PROCEDURE — 97110 THERAPEUTIC EXERCISES: CPT | Mod: GP,CQ

## 2024-10-10 NOTE — PROGRESS NOTES
"Physical Therapy Visit    PT DAILY NOTE FOR OUTPATIENT THERAPY    Patient: Rafi Baird MRN: [id number]  : 1945 79 y.o.  Referring Physician: Anibal Pedroza MD  Date of Visit: 10/10/2024    Certification Dates: 24 through 24    Diagnosis:   1. Unilateral primary osteoarthritis, right knee    2. Unilateral primary osteoarthritis, left knee        Chief Complaints:  B knee OA    Precautions:   Existing Precautions/Restrictions: no known precautions/restrictions      TODAY'S VISIT    Time In Session:  Start Time: 1200  Stop Time: 1254  Time Calculation (min): 54 min   History/Vitals/Pain/Encounter Info - 10/10/24 1157          Injury History/Precautions/Daily Required Info    Document Type daily treatment     Primary Therapist Rachel Barnes PT     Chief Complaint/Reason for Visit  B knee OA     Referring Physician Anibal Pedroza MD     Existing Precautions/Restrictions no known precautions/restrictions     History of present illness/functional impairment Pt is a 79 y.o. female presenting with a referring diagnosis of BL primary knee OA. She reports that her knee pain significantly worsened approximately 6 weeks ago, prompting her to seek treatment, although the pain has been present for several months. Pt received a series of 3 injections a few months ago, which she reports have provided some relief for pain management. Currently, she describes her pain as 3/10 on the NPRS with activity and denies pain at rest. Pt reports difficulty with stair navigation and states that sitting relieves her symptoms. She lives in a 2-story home with 5 BRENNEN from the [facility] and works as a market research analyst. Pt enjoys going to the [employer] and states that her primary goal for PT is to \"feel more stable on my feet.\"     Patient/Family/Caregiver Comments/Observations pt reports to session  \"i'm coming down with a cold, but i woke up for work so i figured i'd come.\" reports R leg really sore after previous " "session, reports \"twingy\" today     Patient reported fall since last visit No        Pain Assessment    Currently in pain Other (see comments)   R \"twingy\" \"not painful enough that i would take a tylonel for, something i notice\"       Pain Intervention    Intervention  TE, monitored     Post Intervention Comments monitored, see assessment         Services    Do You Speak a Language Other Than English at Home? no                    Daily Treatment Assessment and Plan - 10/10/24 1157          Daily Treatment Assessment and Plan    Progress toward goals Progressing     Daily Outcome Summary Pt reports to session reporting soreness in R leg after previous session, states \"twingy\" today \"not really painful\". Pt reports tried updated HEP yesterday, no report of pain \"i noticed i have a tendency to lean so i have to think of standing up straight.\"  Began session with active warmup followed with TE program monitoring pt tolerance. Minimal cues for supine mat series setup. Cues for standing ABD/ext to engage TrA.     Plan and Recommendations continue progression of strengthening as able                         OBJECTIVE DATA TAKEN TODAY:    None taken    Today's Treatment:      DOS M17.11 (ICD-10-CM) - Unilateral primary osteoarthritis, right knee  M17.12 (ICD-10-CM) - Unilateral primary osteoarthritis, left knee   IE 09/12/24   30 Day     60 Day     Precautions    Treatment G/Y/N Current Session Time   Modalities  CPT 88174 Total Time for Session Not performed   MHP     CP post exer   Manual   CPT 24102 Total Time for Session Not perfomed   STM/MFR/TPR STM and effleurage with retrograde massage to L/R post calf post HS and quad region   Instrument Assisted STM      Mobilizations L/R knee patellar mobs AP sup/inf gr II III   ROM/Flexibility L/R knee PROM    Therapeutic Exercise  CPT 45957 Total Time for Session 53-67 Minutes    Sets Reps Load Comment    pt ed  y       Pt educated in HEP per written materials; pt " "verbalized understanding.      NUSTEP  y        level 2 8 min    RB                            active HS stretch in supine  Y 2 10   HEP instructed                 standing knee flex at step 1st/2nd          heel cord stretch  y  5  10\"       standing HS stretch at step                           STRENGTH             SAQ Y 3 10 3\"H HEP    QS SLR Y 3 10 3\"H HEP            bridges y 3 10 ball btwn knees HEP    S/L clamshells y 3 10 PTB    TAC progression:  TAC hip add  table top  TAC table top LE lowering   y  y  y   10  3  2   2    8ea             lateral banded side step y  6 laps PTB    STS  10 2  HEP   bilat HR y 2 10  HEP   unilat HR y  5ea  HEP   step taps   10 ea 6\" lateral   step up progression  1 10 ea    6\" step   Standing hip abduction   y  2  10  OTB     Standing hip extension y 2 10 OTB                  Neuromuscular Re-Education  CPT 76685 Total Time for Session Not performed    Sets Reps Load Comment   circuit balance act             lateral side step             for/back walk // bars                                           Gait  CPT 69838 Total Time for Session Not performed   gait training without AD     stair training to progressive recip steps     Therapeutic Activity  CPT 19579 Total Time for Session Not performed   Pt education Educated patient on examination findings, impairments identified, dx, progress toward goals and PT Plan of Care; Pt educated in HEP per written materials; pt verbalized understanding.       assess  MMT WOMAC TUG 5x STS   Group  CPT 93515 Total Time for Session Not performed                        Abbreviation Legend   OTB - Orange theraband   PTB - Pink theraband   GTB - Green theraband   BTB - Blue theraband   HT - Head turns   HN - Head nods   ST - Semi tandem   EO - Eyes open   EC - Eyes closed                              "

## 2024-10-10 NOTE — OP PT TREATMENT LOG
"  DOS M17.11 (ICD-10-CM) - Unilateral primary osteoarthritis, right knee  M17.12 (ICD-10-CM) - Unilateral primary osteoarthritis, left knee   IE 09/12/24   30 Day     60 Day     Precautions    Treatment G/Y/N Current Session Time   Modalities  CPT 70754 Total Time for Session Not performed   MHP     CP post exer   Manual   CPT 12988 Total Time for Session Not perfomed   STM/MFR/TPR STM and effleurage with retrograde massage to L/R post calf post HS and quad region   Instrument Assisted STM      Mobilizations L/R knee patellar mobs AP sup/inf gr II III   ROM/Flexibility L/R knee PROM    Therapeutic Exercise  CPT 04707 Total Time for Session 53-67 Minutes    Sets Reps Load Comment    pt ed  y       Pt educated in HEP per written materials; pt verbalized understanding.      NUSTEP  y        level 2 8 min    RB                            active HS stretch in supine  Y 2 10   HEP instructed                 standing knee flex at step 1st/2nd          heel cord stretch  y  5  10\"       standing HS stretch at step                           STRENGTH             SAQ Y 3 10 3\"H HEP    QS SLR Y 3 10 3\"H HEP            bridges y 3 10 ball btwn knees HEP    S/L clamshells y 3 10 PTB    TAC progression:  TAC hip add  table top  TAC table top LE lowering   y  y  y   10  3  2   2    8ea             lateral banded side step y  6 laps PTB    STS  10 2  HEP   bilat HR y 2 10  HEP   unilat HR y  5ea  HEP   step taps   10 ea 6\" lateral   step up progression  1 10 ea    6\" step   Standing hip abduction   y  2  10  OTB     Standing hip extension y 2 10 OTB                  Neuromuscular Re-Education  CPT 57449 Total Time for Session Not performed    Sets Reps Load Comment   circuit balance act             lateral side step             for/back walk // bars                                           Gait  CPT 42054 Total Time for Session Not performed   gait training without AD     stair training to progressive recip steps     Therapeutic " Activity  CPT 27897 Total Time for Session Not performed   Pt education Educated patient on examination findings, impairments identified, dx, progress toward goals and PT Plan of Care; Pt educated in HEP per written materials; pt verbalized understanding.       assess  MMT WOMAC TUG 5x STS   Group  CPT 32922 Total Time for Session Not performed                        Abbreviation Legend   OTB - Orange theraband   PTB - Pink theraband   GTB - Green theraband   BTB - Blue theraband   HT - Head turns   HN - Head nods   ST - Semi tandem   EO - Eyes open   EC - Eyes closed

## 2024-10-15 ENCOUNTER — HOSPITAL ENCOUNTER (OUTPATIENT)
Dept: PHYSICAL THERAPY | Facility: CLINIC | Age: 79
Setting detail: THERAPIES SERIES
Discharge: HOME | End: 2024-10-15
Attending: FAMILY MEDICINE
Payer: COMMERCIAL

## 2024-10-15 DIAGNOSIS — M17.11 UNILATERAL PRIMARY OSTEOARTHRITIS, RIGHT KNEE: Primary | ICD-10-CM

## 2024-10-15 DIAGNOSIS — M17.12 UNILATERAL PRIMARY OSTEOARTHRITIS, LEFT KNEE: ICD-10-CM

## 2024-10-15 PROCEDURE — 97110 THERAPEUTIC EXERCISES: CPT | Mod: GP

## 2024-10-15 NOTE — PROGRESS NOTES
"Physical Therapy Visit    PT DAILY NOTE FOR OUTPATIENT THERAPY    Patient: Rafi Baird MRN: [id number]  : 1945 79 y.o.  Referring Physician: Anibal Pedroza MD  Date of Visit: 10/15/2024    Certification Dates: 24 through 24    Diagnosis:   1. Unilateral primary osteoarthritis, right knee    2. Unilateral primary osteoarthritis, left knee        Chief Complaints:  B knee OA    Precautions:   Existing Precautions/Restrictions: no known precautions/restrictions      TODAY'S VISIT    Time In Session:  Start Time: 1203  Stop Time: 1259  Time Calculation (min): 56 min   History/Vitals/Pain/Encounter Info - 10/15/24 1205          Injury History/Precautions/Daily Required Info    Document Type daily treatment     Primary Therapist Rachel Barnes PT     Chief Complaint/Reason for Visit  B knee OA     Referring Physician Anibal Pedroza MD     Existing Precautions/Restrictions no known precautions/restrictions     History of present illness/functional impairment Pt is a 79 y.o. female presenting with a referring diagnosis of BL primary knee OA. She reports that her knee pain significantly worsened approximately 6 weeks ago, prompting her to seek treatment, although the pain has been present for several months. Pt received a series of 3 injections a few months ago, which she reports have provided some relief for pain management. Currently, she describes her pain as 3/10 on the NPRS with activity and denies pain at rest. Pt reports difficulty with stair navigation and states that sitting relieves her symptoms. She lives in a 2-story home with 5 BRENNEN from the [facility] and works as a market research analyst. Pt enjoys going to the [employer] [employer] and states that her primary goal for PT is to \"feel more stable on my feet.\"     Patient/Family/Caregiver Comments/Observations I feel like this is helping me, I notice I am going up/down stairs more easily     Patient reported fall since last visit No        Pain " Assessment    Currently in pain No/Denies         Services    Do You Speak a Language Other Than English at Home? no                    Daily Treatment Assessment and Plan - 10/15/24 1205          Daily Treatment Assessment and Plan    Progress toward goals Progressing     Daily Outcome Summary pt reports knees feeling better, notices doing the stairs easier at home  initiated with active warmup Nustep  performed mat based exercises, demo improved engagement of quad to decr lag with SLR  demo improved core stability with standing resisted ext and abd exercises  instructed in heel cord stretch off edge of step for HEP  pt denies any increased pain/sx with activities performed this session  Patient has been instructed to continue with HEP; pt verbalized understanding.  Next session plan progression of strengthening activities and work on progression of HEP     Plan and Recommendations Next session plan progression of strengthening activities and work finalizing of HEP                         OBJECTIVE DATA TAKEN TODAY:    None taken    Today's Treatment:      DOS M17.11 (ICD-10-CM) - Unilateral primary osteoarthritis, right knee  M17.12 (ICD-10-CM) - Unilateral primary osteoarthritis, left knee   IE 09/12/24   30 Day     60 Day     Precautions    Treatment G/Y/N Current Session Time   Modalities  CPT 56127 Total Time for Session Not performed   MHP     CP post exer   Manual   CPT 24661 Total Time for Session Not perfomed   STM/MFR/TPR STM and effleurage with retrograde massage to L/R post calf post HS and quad region   Instrument Assisted STM      Mobilizations L/R knee patellar mobs AP sup/inf gr II III   ROM/Flexibility L/R knee PROM    Therapeutic Exercise  CPT 99759 Total Time for Session 53-67 Minutes    Sets Reps Load Comment    pt ed  y       Pt educated in HEP per written materials; pt verbalized understanding.      NUSTEP  y        level 2 8 min    RB                            active HS stretch in  "supine  Y 2 10   HEP instructed                 standing knee flex at step 1st/2nd          heel cord stretch  y  5  10\"       standing HS stretch at step                           STRENGTH             SAQ Y 3 10 3\"H HEP    QS SLR Y 3 10 3\"H HEP            bridges y 3 10 ball btwn knees HEP    S/L clamshells y 3 10 PTB    TAC progression:  TAC hip add  table top  TAC table top LE lowering   y  y  y   10  3  2   2    8ea             lateral banded side step y  6 laps PTB    STS  10 2  HEP   bilat HR y 2 10  HEP   unilat HR y  5ea  HEP   step taps   10 ea 6\" lateral   step up progression  1 10 ea    6\" step   Standing hip abduction   y  2  10  OTB     Standing hip extension y 2 10 OTB                  Neuromuscular Re-Education  CPT 70035 Total Time for Session Not performed    Sets Reps Load Comment   circuit balance act             lateral side step             for/back walk // bars                                           Gait  CPT 32833 Total Time for Session Not performed   gait training without AD     stair training to progressive recip steps     Therapeutic Activity  CPT 59682 Total Time for Session Not performed   Pt education Educated patient on examination findings, impairments identified, dx, progress toward goals and PT Plan of Care; Pt educated in HEP per written materials; pt verbalized understanding.       assess  MMT WOMAC TUG 5x STS   Group  CPT 01796 Total Time for Session Not performed                            Abbreviation Legend   OTB - Orange theraband   PTB - Pink theraband   GTB - Green theraband   BTB - Blue theraband   HT - Head turns   HN - Head nods   ST - Semi tandem   EO - Eyes open   EC - Eyes closed                              "

## 2024-10-15 NOTE — OP PT TREATMENT LOG
"  DOS M17.11 (ICD-10-CM) - Unilateral primary osteoarthritis, right knee  M17.12 (ICD-10-CM) - Unilateral primary osteoarthritis, left knee   IE 09/12/24   30 Day     60 Day     Precautions    Treatment G/Y/N Current Session Time   Modalities  CPT 86889 Total Time for Session Not performed   MHP     CP post exer   Manual   CPT 48561 Total Time for Session Not perfomed   STM/MFR/TPR STM and effleurage with retrograde massage to L/R post calf post HS and quad region   Instrument Assisted STM      Mobilizations L/R knee patellar mobs AP sup/inf gr II III   ROM/Flexibility L/R knee PROM    Therapeutic Exercise  CPT 33853 Total Time for Session 53-67 Minutes    Sets Reps Load Comment    pt ed  y       Pt educated in HEP per written materials; pt verbalized understanding.      NUSTEP  y        level 2 8 min    RB                            active HS stretch in supine  Y 2 10   HEP instructed                 standing knee flex at step 1st/2nd          heel cord stretch  y  5  10\"       standing HS stretch at step                           STRENGTH             SAQ Y 3 10 3\"H HEP    QS SLR Y 3 10 3\"H HEP            bridges y 3 10 ball btwn knees HEP    S/L clamshells y 3 10 PTB    TAC progression:  TAC hip add  table top  TAC table top LE lowering   y  y  y   10  3  2   2    8ea             lateral banded side step y  6 laps PTB    STS  10 2  HEP   bilat HR y 2 10  HEP   unilat HR y  5ea  HEP   step taps   10 ea 6\" lateral   step up progression  1 10 ea    6\" step   Standing hip abduction   y  2  10  OTB     Standing hip extension y 2 10 OTB                  Neuromuscular Re-Education  CPT 79832 Total Time for Session Not performed    Sets Reps Load Comment   circuit balance act             lateral side step             for/back walk // bars                                           Gait  CPT 20545 Total Time for Session Not performed   gait training without AD     stair training to progressive recip steps     Therapeutic " Activity  CPT 39905 Total Time for Session Not performed   Pt education Educated patient on examination findings, impairments identified, dx, progress toward goals and PT Plan of Care; Pt educated in HEP per written materials; pt verbalized understanding.       assess  MMT WOMAC TUG 5x STS   Group  CPT 69345 Total Time for Session Not performed                            Abbreviation Legend   OTB - Orange theraband   PTB - Pink theraband   GTB - Green theraband   BTB - Blue theraband   HT - Head turns   HN - Head nods   ST - Semi tandem   EO - Eyes open   EC - Eyes closed

## 2025-03-25 ENCOUNTER — DOCUMENTATION (OUTPATIENT)
Dept: PHYSICAL THERAPY | Facility: CLINIC | Age: 80
End: 2025-03-25
Payer: COMMERCIAL

## 2025-03-25 NOTE — PROGRESS NOTES
"Physical Therapy Discharge      PT DISCHARGE NOTE FOR OUTPATIENT THERAPY    Patient: Rafi Baird MRN: [id number]  : 1945 79 y.o.  Referring Physician: No ref. provider found  Date of Visit: 3/25/2025      Certification Dates:   through      Total Visit Count: 9 (last visit 10/15/24)    Diagnosis: No diagnosis found.    Chief Complaints:  Chief Complaint   Patient presents with    Other - See comments     No Visit discharge       Precautions:         TODAY'S VISIT:    Time In Session:     No further follow-up after the last visit on 10/15/25 where patient was reporting improvement. She is therefore discharged from active therapy follow-up.               OBJECTIVE MEASUREMENTS/DATA:    None taken    ROM and MMT          10/8/2024    15:12   PT LE MMT   Right Hip Flexion (4+/5) good plus   Left Hip Flexion (4/5) good   Right Hip Extension (4/5) good   Left Hip Extension (4/5) good   Right Hip ABD (4/5) good   Left Hip ABD (4/5) good   Right Hip ADD (4/5) good   Left Hip ADD (4/5) good   Right Knee Flexion (4+/5) good plus   Left Knee Flexion (4+/5) good plus   Right Knee Extension (4+/5) good plus   Left Knee Extension (4+/5) good plus     Outcome Measures          10/8/2024    15:12   PT OBJECTIVE Outcome Measures   Five Times Sit to Stand 11.5 sec no hands   PT SUBJECTIVE Outcome Measures   Other WOMAC 28%          Today's Treatment:    Education provided:  None/NA         Goals Addressed                      This Visit's Progress      COMPLETED: Mutually agreed upon pain goal         Mutually agreed upon pain goal: NPRS </= 2/10  10/8/24 IMPROVED   3/25/25 - No further F/U post 10/15/24        COMPLETED: PT Goals (pt-stated)         No Further F/U post 10/15/24  Patient stated:  \"I want to feel more steady on my feet.\" 10/8/24 IMPROVED    SHORT TERM GOALS:  Pt reports pain levels < 3 /10 to perform ADLs.10/8/24 IMPROVED  Pt will demonstrate increase BL knee and hip strength > 3+ /5 to improve " functional mobility. In 4 weeks.10/8/24 MET  Pt performs 5xSTS in <12s in order to improve functional mobility. In 4 weeks.10/8/24 MET  Pt demonstrates  I with HEP as instructed. In 4 weeks.10/8/24 INSTRUCTED  Pt WOMAC score < 20%. In 4 weeks.10/8/24 IMPROVED    LONG TERM GOALS:10/8/24 ONGOING  Pt reports pain levels < 2 /10 to perform ADLs.  Pt will demonstrate increase BL knee strength > 4 /5 to improve functional mobility. In 8 weeks.  Pt demonstrates  I with HEP as instructed. In 8 weeks.  Pt WOMAC score < 15%. In 8 weeks.  Pt performs stair ascend/ descend  with reciprocal pattern I with handrail and NPRS <2/10. In 8 weeks.

## 2025-04-18 ENCOUNTER — TRANSCRIBE ORDERS (OUTPATIENT)
Dept: SCHEDULING | Age: 80
End: 2025-04-18

## 2025-04-18 DIAGNOSIS — Z12.31 OTHER SCREENING MAMMOGRAM: Primary | ICD-10-CM

## 2025-04-18 DIAGNOSIS — M85.89 OSTEOPENIA OF MULTIPLE SITES: ICD-10-CM

## 2025-04-18 DIAGNOSIS — Z12.31 ENCOUNTER FOR SCREENING MAMMOGRAM FOR BREAST CANCER: ICD-10-CM

## 2025-04-30 ENCOUNTER — HOSPITAL ENCOUNTER (OUTPATIENT)
Dept: RADIOLOGY | Facility: CLINIC | Age: 80
Discharge: HOME | End: 2025-04-30
Attending: INTERNAL MEDICINE
Payer: COMMERCIAL

## 2025-04-30 DIAGNOSIS — Z12.31 ENCOUNTER FOR SCREENING MAMMOGRAM FOR BREAST CANCER: ICD-10-CM

## 2025-04-30 DIAGNOSIS — M85.89 OSTEOPENIA OF MULTIPLE SITES: ICD-10-CM

## 2025-04-30 DIAGNOSIS — Z12.31 OTHER SCREENING MAMMOGRAM: ICD-10-CM

## 2025-04-30 PROCEDURE — 77063 BREAST TOMOSYNTHESIS BI: CPT

## 2025-04-30 PROCEDURE — 77080 DXA BONE DENSITY AXIAL: CPT

## 2025-05-19 ENCOUNTER — TRANSCRIBE ORDERS (OUTPATIENT)
Dept: SCHEDULING | Age: 80
End: 2025-05-19

## 2025-05-19 DIAGNOSIS — I71.20 ANEURYSM, AORTA, THORACIC (CMS/HCC): Primary | ICD-10-CM

## 2025-05-30 ENCOUNTER — TRANSCRIBE ORDERS (OUTPATIENT)
Dept: LAB | Facility: CLINIC | Age: 80
End: 2025-05-30

## 2025-05-30 ENCOUNTER — APPOINTMENT (OUTPATIENT)
Dept: LAB | Facility: CLINIC | Age: 80
End: 2025-05-30
Attending: INTERNAL MEDICINE
Payer: COMMERCIAL

## 2025-05-30 DIAGNOSIS — E78.5 HYPERLIPIDEMIA: Primary | ICD-10-CM

## 2025-05-30 DIAGNOSIS — E78.5 HYPERLIPIDEMIA: ICD-10-CM

## 2025-05-30 LAB
ALBUMIN SERPL-MCNC: 4.2 G/DL (ref 3.5–5.7)
ALP SERPL-CCNC: 39 IU/L (ref 34–125)
ALT SERPL-CCNC: 15 IU/L (ref 7–52)
ANION GAP SERPL CALC-SCNC: 6 MEQ/L (ref 3–15)
AST SERPL-CCNC: 17 IU/L (ref 13–39)
BASOPHILS # BLD: 0.12 K/UL (ref 0.01–0.1)
BASOPHILS NFR BLD: 1.8 %
BILIRUB SERPL-MCNC: 0.5 MG/DL (ref 0.3–1.2)
BUN SERPL-MCNC: 17 MG/DL (ref 7–25)
CALCIUM SERPL-MCNC: 9.7 MG/DL (ref 8.6–10.3)
CHLORIDE SERPL-SCNC: 102 MEQ/L (ref 98–107)
CO2 SERPL-SCNC: 32 MEQ/L (ref 21–31)
CREAT SERPL-MCNC: 0.7 MG/DL (ref 0.6–1.2)
DIFFERENTIAL METHOD BLD: ABNORMAL
EGFRCR SERPLBLD CKD-EPI 2021: >60 ML/MIN/1.73M*2
EOSINOPHIL # BLD: 0.15 K/UL (ref 0.04–0.36)
EOSINOPHIL NFR BLD: 2.2 %
ERYTHROCYTE [DISTWIDTH] IN BLOOD BY AUTOMATED COUNT: 13 % (ref 11.7–14.4)
GLUCOSE SERPL-MCNC: 98 MG/DL (ref 70–99)
HCT VFR BLD AUTO: 42.7 % (ref 35–45)
HGB BLD-MCNC: 14 G/DL (ref 11.8–15.7)
IMM GRANULOCYTES # BLD AUTO: 0.01 K/UL (ref 0–0.08)
IMM GRANULOCYTES NFR BLD AUTO: 0.1 %
LYMPHOCYTES # BLD: 2.86 K/UL (ref 1.2–3.5)
LYMPHOCYTES NFR BLD: 42.8 %
MCH RBC QN AUTO: 31.8 PG (ref 28–33.2)
MCHC RBC AUTO-ENTMCNC: 32.8 G/DL (ref 32.2–35.5)
MCV RBC AUTO: 97 FL (ref 83–98)
MONOCYTES # BLD: 0.52 K/UL (ref 0.28–0.8)
MONOCYTES NFR BLD: 7.8 %
NEUTROPHILS # BLD: 3.03 K/UL (ref 1.7–7)
NEUTS SEG NFR BLD: 45.3 %
NRBC BLD-RTO: 0 %
PLATELET # BLD AUTO: 326 K/UL (ref 150–369)
PMV BLD AUTO: 10.5 FL (ref 9.4–12.3)
POTASSIUM SERPL-SCNC: 4.3 MEQ/L (ref 3.5–5.1)
PROT SERPL-MCNC: 6.9 G/DL (ref 6–8.2)
RBC # BLD AUTO: 4.4 M/UL (ref 3.93–5.22)
SODIUM SERPL-SCNC: 140 MEQ/L (ref 136–145)
WBC # BLD AUTO: 6.69 K/UL (ref 3.8–10.5)

## 2025-05-30 PROCEDURE — 85025 COMPLETE CBC W/AUTO DIFF WBC: CPT

## 2025-05-30 PROCEDURE — 36415 COLL VENOUS BLD VENIPUNCTURE: CPT

## 2025-05-30 PROCEDURE — 80053 COMPREHEN METABOLIC PANEL: CPT

## 2025-06-10 ENCOUNTER — HOSPITAL ENCOUNTER (OUTPATIENT)
Dept: RADIOLOGY | Facility: CLINIC | Age: 80
Discharge: HOME | End: 2025-06-10
Attending: INTERNAL MEDICINE
Payer: COMMERCIAL

## 2025-06-10 DIAGNOSIS — I71.20 ANEURYSM, AORTA, THORACIC (CMS/HCC): ICD-10-CM

## 2025-06-10 PROCEDURE — 63600105 HC IODINE BASED CONTRAST: Mod: JZ

## 2025-06-10 PROCEDURE — 74174 CTA ABD&PLVS W/CONTRAST: CPT

## 2025-06-10 RX ORDER — IOPAMIDOL 755 MG/ML
100 INJECTION, SOLUTION INTRAVASCULAR
Status: COMPLETED | OUTPATIENT
Start: 2025-06-10 | End: 2025-06-10

## 2025-06-10 RX ADMIN — IOPAMIDOL 100 ML: 755 INJECTION, SOLUTION INTRAVENOUS at 14:49
